# Patient Record
Sex: FEMALE | Race: WHITE | HISPANIC OR LATINO | Employment: FULL TIME | URBAN - METROPOLITAN AREA
[De-identification: names, ages, dates, MRNs, and addresses within clinical notes are randomized per-mention and may not be internally consistent; named-entity substitution may affect disease eponyms.]

---

## 2021-07-27 ENCOUNTER — PRENATAL OFFICE VISIT (OUTPATIENT)
Dept: NURSING | Facility: CLINIC | Age: 31
End: 2021-07-27

## 2021-07-27 VITALS — BODY MASS INDEX: 30.83 KG/M2 | WEIGHT: 174 LBS | HEIGHT: 63 IN

## 2021-07-27 DIAGNOSIS — Z98.891 HISTORY OF CESAREAN SECTION: ICD-10-CM

## 2021-07-27 DIAGNOSIS — Z23 NEED FOR TDAP VACCINATION: ICD-10-CM

## 2021-07-27 DIAGNOSIS — Z34.90 ENCOUNTER FOR SUPERVISION OF NORMAL PREGNANCY: Primary | ICD-10-CM

## 2021-07-27 PROCEDURE — 99207 PR NO CHARGE NURSE ONLY: CPT

## 2021-07-27 RX ORDER — PRENATAL VIT/IRON FUM/FOLIC AC 27MG-0.8MG
1 TABLET ORAL DAILY
COMMUNITY

## 2021-07-27 ASSESSMENT — MIFFLIN-ST. JEOR: SCORE: 1468.26
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
## 2021-08-09 ENCOUNTER — PRENATAL OFFICE VISIT (OUTPATIENT)
Dept: MIDWIFE SERVICES | Facility: CLINIC | Age: 31
End: 2021-08-09
Payer: MEDICAID

## 2021-08-09 VITALS
HEART RATE: 98 BPM | SYSTOLIC BLOOD PRESSURE: 114 MMHG | BODY MASS INDEX: 32.11 KG/M2 | HEIGHT: 63 IN | OXYGEN SATURATION: 98 % | DIASTOLIC BLOOD PRESSURE: 72 MMHG | WEIGHT: 181.2 LBS

## 2021-08-09 DIAGNOSIS — Z98.891 HISTORY OF CESAREAN SECTION: ICD-10-CM

## 2021-08-09 DIAGNOSIS — G44.209 TENSION HEADACHE: ICD-10-CM

## 2021-08-09 DIAGNOSIS — O34.219 PREVIOUS CESAREAN DELIVERY, ANTEPARTUM CONDITION OR COMPLICATION: Primary | ICD-10-CM

## 2021-08-09 LAB
ABO/RH(D): NORMAL
ALBUMIN UR-MCNC: NEGATIVE MG/DL
ANTIBODY SCREEN: NEGATIVE
APPEARANCE UR: CLEAR
BILIRUB UR QL STRIP: NEGATIVE
COLOR UR AUTO: YELLOW
ERYTHROCYTE [DISTWIDTH] IN BLOOD BY AUTOMATED COUNT: 13.6 % (ref 10–15)
GLUCOSE UR STRIP-MCNC: NEGATIVE MG/DL
HBA1C MFR BLD: 5.2 % (ref 0–5.6)
HCT VFR BLD AUTO: 34.2 % (ref 35–47)
HGB BLD-MCNC: 11.2 G/DL (ref 11.7–15.7)
HGB UR QL STRIP: ABNORMAL
KETONES UR STRIP-MCNC: NEGATIVE MG/DL
LEUKOCYTE ESTERASE UR QL STRIP: NEGATIVE
MCH RBC QN AUTO: 30.2 PG (ref 26.5–33)
MCHC RBC AUTO-ENTMCNC: 32.7 G/DL (ref 31.5–36.5)
MCV RBC AUTO: 92 FL (ref 78–100)
NITRATE UR QL: NEGATIVE
PH UR STRIP: 6.5 [PH] (ref 5–7)
PLATELET # BLD AUTO: 289 10E3/UL (ref 150–450)
RBC # BLD AUTO: 3.71 10E6/UL (ref 3.8–5.2)
SP GR UR STRIP: >=1.03 (ref 1–1.03)
SPECIMEN EXPIRATION DATE: NORMAL
UROBILINOGEN UR STRIP-ACNC: 0.2 E.U./DL
WBC # BLD AUTO: 10.9 10E3/UL (ref 4–11)

## 2021-08-09 PROCEDURE — 87086 URINE CULTURE/COLONY COUNT: CPT | Performed by: ADVANCED PRACTICE MIDWIFE

## 2021-08-09 PROCEDURE — 83036 HEMOGLOBIN GLYCOSYLATED A1C: CPT | Performed by: ADVANCED PRACTICE MIDWIFE

## 2021-08-09 PROCEDURE — G0145 SCR C/V CYTO,THINLAYER,RESCR: HCPCS | Performed by: ADVANCED PRACTICE MIDWIFE

## 2021-08-09 PROCEDURE — 86803 HEPATITIS C AB TEST: CPT | Performed by: ADVANCED PRACTICE MIDWIFE

## 2021-08-09 PROCEDURE — 86901 BLOOD TYPING SEROLOGIC RH(D): CPT | Performed by: ADVANCED PRACTICE MIDWIFE

## 2021-08-09 PROCEDURE — 81003 URINALYSIS AUTO W/O SCOPE: CPT | Performed by: ADVANCED PRACTICE MIDWIFE

## 2021-08-09 PROCEDURE — 87340 HEPATITIS B SURFACE AG IA: CPT | Performed by: ADVANCED PRACTICE MIDWIFE

## 2021-08-09 PROCEDURE — 87389 HIV-1 AG W/HIV-1&-2 AB AG IA: CPT | Performed by: ADVANCED PRACTICE MIDWIFE

## 2021-08-09 PROCEDURE — 86762 RUBELLA ANTIBODY: CPT | Performed by: ADVANCED PRACTICE MIDWIFE

## 2021-08-09 PROCEDURE — 86850 RBC ANTIBODY SCREEN: CPT | Performed by: ADVANCED PRACTICE MIDWIFE

## 2021-08-09 PROCEDURE — T1013 SIGN LANG/ORAL INTERPRETER: HCPCS | Mod: U4 | Performed by: ADVANCED PRACTICE MIDWIFE

## 2021-08-09 PROCEDURE — 86900 BLOOD TYPING SEROLOGIC ABO: CPT | Performed by: ADVANCED PRACTICE MIDWIFE

## 2021-08-09 PROCEDURE — 87624 HPV HI-RISK TYP POOLED RSLT: CPT | Performed by: ADVANCED PRACTICE MIDWIFE

## 2021-08-09 PROCEDURE — 36415 COLL VENOUS BLD VENIPUNCTURE: CPT | Performed by: ADVANCED PRACTICE MIDWIFE

## 2021-08-09 PROCEDURE — 86780 TREPONEMA PALLIDUM: CPT | Performed by: ADVANCED PRACTICE MIDWIFE

## 2021-08-09 PROCEDURE — 99203 OFFICE O/P NEW LOW 30 MIN: CPT | Performed by: ADVANCED PRACTICE MIDWIFE

## 2021-08-09 PROCEDURE — 85027 COMPLETE CBC AUTOMATED: CPT | Performed by: ADVANCED PRACTICE MIDWIFE

## 2021-08-09 RX ORDER — ACETAMINOPHEN 325 MG/1
325-650 TABLET ORAL EVERY 6 HOURS PRN
Qty: 90 TABLET | Refills: 1 | Status: SHIPPED | OUTPATIENT
Start: 2021-08-09 | End: 2021-10-26

## 2021-08-09 ASSESSMENT — MIFFLIN-ST. JEOR: SCORE: 1500.89

## 2021-08-09 NOTE — PROGRESS NOTES
"15w0d   Seen with phone .  Pt feeling well overall, c/o HA, reviewed comfort measures.  Pt had U/S done at [facility] 12w2d which correlates to her LMP date.  [facility], States it was after her due date, she was 3 cm, reports never having felt any pain or labor, the Doctor told her that the baby wasn't coming down and that she needed to have an emergency .  Pt would like to TOLAC if safe to do so.  Discussed need for MD visit for consult.  RTC 4-5 wks for anatomy survey and CNM visit.  FLORESITA Colon CNM     Sultana Khloe Alcon Brand is a 31 year old female    Pt presents to clinic today for a NOB visit.  Patient's last menstrual period was 2021.. Estimated Date of Delivery: 2022 is calculated from lmp and verified with U/S     She has had bleeding since her LMP which has now stopped..   She has had mild nausea. Weight loss has not occurred.   This was a planned pregnancy.   FOB is involved,       OTHER CONCERNS:     Pt's hx of HSV is negative    ============================================  PERSONAL/SOCIAL HISTORY  Lives lives with their family.  Employment: Homemaker.  Her job involves light activity .  Exercises no regular exercise program  Pt denies abuse and feels safe in her home and relationships.     REVIEW OF SYSTEMS  C: NEGATIVE for fever, chills, change in weight  I: NEGATIVE for worrisome rashes, moles or lesions  E/M: NEGATIVE for ear, mouth and throat problems  R: NEGATIVE for significant cough or SOB  CV: NEGATIVE for chest pain, palpitations or peripheral edema  GI: NEGATIVE for nausea, abdominal pain, heartburn, or change in bowel habits  : NEGATIVE for frequency, dysuria, or hematuria  PSYCHIATRIC:  NEGATIVE for depression, anxiety or other mental health concerns    PHYSICAL EXAM:  /72   Pulse 98   Ht 1.6 m (5' 2.99\")   Wt 82.2 kg (181 lb 3.2 oz)   LMP 2021   SpO2 98%   BMI 32.11 kg/m    BMI- Body " mass index is 32.11 kg/m ., RECOMMENDED WEIGHT GAIN: < 15 lbs.  GENERAL:  Pleasant pregnant female, alert, cooperative and well groomed.  SKIN:  Warm and dry, without lesions or rashes  HEAD: Symmetrical features.  MOUTH:  Buccal mucosa pink, moist without lesions.  Teeth in good repair.    NECK:  Thyroid without enlargement and nodules.  Lymph nodes not palpable.   LUNGS:  Clear to auscultation.      HEART:  RRR without murmur.  ABDOMEN: Soft without masses , tenderness or organomegaly.  No CVA tenderness.  Uterus palpable at size equal to dates.  Well healed scar from  section.  MUSCULOSKELETAL:  Full range of motion  EXTREMITIES:  No edema. No significant varicosities.     PELVIC EXAM:  GENITALIA: EGBUS normal. Vulva reveals no erythema or lesions.         VAGINA:  pink, normal rugae and discharge, no lesions, good tone.   CERVIX:  smooth, without discharge or CMT. Friable cervix with collection of pap smear               UTERUS: Anteverted,  nontender 15 week size.   ADNEXA:  Without masses or tenderness  RECTAL:  Normal appearance.  Digital exam deferred.    ASSESSMENT:  Intrauterine pregnancy at 15w0d weeks gestation.     (O34.219) Previous  delivery, antepartum condition or complication  (primary encounter diagnosis)  Comment:   Plan: US OB > 14 Weeks            (G44.209) Tension headache  Comment:   Plan: acetaminophen (TYLENOL) 325 MG tablet            (Z34.90) Encounter for supervision of normal pregnancy  Comment:   Plan: Pap imaged thin layer screen with HPV -         recommended age 30 - 65 years (select HPV order        below)              PLAN:  Oriented to CNM service.    Instructed on use of triage nurse line and contacting the on call CNM after hours for an urgent need such as fever, vagina bleeding, bladder or vaginal infection, rupture of membranes,  or term labor.      Discussed the indications, uses for and false positives for quad screen  and fetal survey ultrasound at  18-20 weeks gestation. Will inform us at the next visit if she wished to avail herself of these screens.    Instructed on best evidence for: weight gain for her weight and height for pregnancy; healthy diet; exercise and activity during pregnancy; and maintenance of a generally healthy lifestyle.     Discussed the harms, benefits, side effects and alternative therapies for current prescribed and OTC medications.    Lorene Quiroga CNM

## 2021-08-10 LAB
BACTERIA UR CULT: NO GROWTH
HBV SURFACE AG SERPL QL IA: NONREACTIVE
HCV AB SERPL QL IA: NONREACTIVE
HIV 1+2 AB+HIV1 P24 AG SERPL QL IA: NONREACTIVE
T PALLIDUM AB SER QL: NONREACTIVE

## 2021-08-11 LAB
BKR LAB AP GYN ADEQUACY: NORMAL
BKR LAB AP GYN INTERPRETATION: NORMAL
BKR LAB AP HPV REFLEX: NORMAL
BKR LAB AP LMP: NORMAL
BKR LAB AP PREVIOUS ABNORMAL: NORMAL
PATH REPORT.COMMENTS IMP SPEC: NORMAL
PATH REPORT.RELEVANT HX SPEC: NORMAL
RUBV IGG SERPL QL IA: 7.42 INDEX
RUBV IGG SERPL QL IA: POSITIVE

## 2021-08-13 LAB
HUMAN PAPILLOMA VIRUS 16 DNA: NEGATIVE
HUMAN PAPILLOMA VIRUS 18 DNA: NEGATIVE
HUMAN PAPILLOMA VIRUS FINAL DIAGNOSIS: NORMAL
HUMAN PAPILLOMA VIRUS OTHER HR: NEGATIVE

## 2021-09-13 ENCOUNTER — ANCILLARY PROCEDURE (OUTPATIENT)
Dept: ULTRASOUND IMAGING | Facility: CLINIC | Age: 31
End: 2021-09-13
Attending: ADVANCED PRACTICE MIDWIFE
Payer: MEDICAID

## 2021-09-13 DIAGNOSIS — O34.219 PREVIOUS CESAREAN DELIVERY, ANTEPARTUM CONDITION OR COMPLICATION: ICD-10-CM

## 2021-09-13 PROCEDURE — 76805 OB US >/= 14 WKS SNGL FETUS: CPT | Performed by: FAMILY MEDICINE

## 2021-09-14 ENCOUNTER — PRENATAL OFFICE VISIT (OUTPATIENT)
Dept: MIDWIFE SERVICES | Facility: CLINIC | Age: 31
End: 2021-09-14
Payer: MEDICAID

## 2021-09-14 VITALS — DIASTOLIC BLOOD PRESSURE: 64 MMHG | WEIGHT: 184 LBS | BODY MASS INDEX: 32.6 KG/M2 | SYSTOLIC BLOOD PRESSURE: 105 MMHG

## 2021-09-14 DIAGNOSIS — Z11.3 SCREENING FOR GONORRHEA: ICD-10-CM

## 2021-09-14 DIAGNOSIS — Z11.8 SPECIAL SCREENING EXAMINATION FOR CHLAMYDIAL DISEASE: ICD-10-CM

## 2021-09-14 DIAGNOSIS — B96.89 BV (BACTERIAL VAGINOSIS): ICD-10-CM

## 2021-09-14 DIAGNOSIS — K08.89 PAIN, DENTAL: ICD-10-CM

## 2021-09-14 DIAGNOSIS — Z34.82 ENCOUNTER FOR SUPERVISION OF OTHER NORMAL PREGNANCY, SECOND TRIMESTER: Primary | ICD-10-CM

## 2021-09-14 DIAGNOSIS — N76.0 BV (BACTERIAL VAGINOSIS): ICD-10-CM

## 2021-09-14 LAB
CLUE CELLS: PRESENT
TRICHOMONAS, WET PREP: ABNORMAL
WBC'S/HIGH POWER FIELD, WET PREP: ABNORMAL
YEAST, WET PREP: ABNORMAL

## 2021-09-14 PROCEDURE — 87591 N.GONORRHOEAE DNA AMP PROB: CPT | Performed by: ADVANCED PRACTICE MIDWIFE

## 2021-09-14 PROCEDURE — 87210 SMEAR WET MOUNT SALINE/INK: CPT | Performed by: ADVANCED PRACTICE MIDWIFE

## 2021-09-14 PROCEDURE — 87491 CHLMYD TRACH DNA AMP PROBE: CPT | Performed by: ADVANCED PRACTICE MIDWIFE

## 2021-09-14 PROCEDURE — 99213 OFFICE O/P EST LOW 20 MIN: CPT | Performed by: ADVANCED PRACTICE MIDWIFE

## 2021-09-14 RX ORDER — PRENATAL VIT/IRON FUM/FOLIC AC 27MG-0.8MG
1 TABLET ORAL DAILY
Qty: 90 TABLET | Refills: 3 | Status: ON HOLD | OUTPATIENT
Start: 2021-09-14 | End: 2021-11-06

## 2021-09-14 RX ORDER — METRONIDAZOLE 500 MG/1
500 TABLET ORAL 2 TIMES DAILY
Qty: 14 TABLET | Refills: 0 | Status: SHIPPED | OUTPATIENT
Start: 2021-09-14 | End: 2021-09-21

## 2021-09-14 NOTE — PROGRESS NOTES
20w1d  Sultana is here after fetal survey was done yesterday. Seen with assistance of  by phone. Fetal survey with normal fetal growth, but some structures not seen. Recommend follow-up in our clinic in 2-3w. She complains of pelvic pressure/heaviness. Denies vaginal symptoms. Wet prep, GC/CT collected. Wet prep shows BV, Flagyl sent to pharmacy, and patient updated with help of . Baby is active, denies bleeding, leaking of fluid, headaches. Return to care 2-3w for f/u U/S.  Wallace Scott CNM

## 2021-09-15 ENCOUNTER — PATIENT OUTREACH (OUTPATIENT)
Dept: CARE COORDINATION | Facility: CLINIC | Age: 31
End: 2021-09-15

## 2021-09-15 LAB
C TRACH DNA SPEC QL NAA+PROBE: NEGATIVE
N GONORRHOEA DNA SPEC QL NAA+PROBE: NEGATIVE

## 2021-09-15 NOTE — LETTER
[facility] CARE COORDINATION  [facility]  [address] [place] [address]      Estimado(a) Sultana,    Soy un(a) coordinador(a) de atención ambulatoria que trabaja con Wallace Scott CNM, en [facility]. La presente es para informarle acerca de la coordinación de atención ambulatoria y cómo podremos ayudarle a lograr los objetivos relacionados a ellis connor.    Nuestro equipo responsable de la coordinación de atención ambulatoria cuenta con un profesional de enfermería, un trabajador social y un promotor de connor. El objetivo de la coordinación de atención ambulatoria es ayudarle a atender ellis connor y a mejorar el acceso al sistema de atención médica de leif manera eficiente. Nuestro equipo puede ayudarle a lograr delfina objetivos para el cuidado de la connor al brindarle información médica, coordinar servicios, fortalecer la comunicación entre los proveedores y brindarle apoyo con recursos que necesite.    No dude en comunicarse con me al [phone] si tuviera cualquier pregunta o inquietud. Estamos enfocados en brindarle la mejor experiencia de atención médica posible. Queremos ayudarle a alcanzar y mantener delfina objetivos de connor.    Atentamente,     Brea Ballard  Community Health Worker  [facility]  [phone]      Dear ,    I am a clinic care coordinator who works with   . I'm writing to tell you about clinic care coordination, and how we may be able to support you in achieving your health-related goals.    Our clinic care coordination team consists of a registered nurse, , and community health worker. The goal of clinic care coordination is to help you manage your health and to improve your access to the health care system in an efficient way. Our team can help you meet your health care goals by providing you with health information, coordinating services, strengthening the  communication among your providers, and supporting you with any resource needs.    Please feel free to contact  at  with any questions or concerns. We are focused on providing you with the highest-quality health care experience possible. We want to help you achieve and maintain your health goals.    Sincerely,

## 2021-09-15 NOTE — PROGRESS NOTES
Clinic Care Coordination Contact  [facility]/Voicemail    Left VM on pt's phone this afternoon via     Chart review: PCP referral: Pt has tooth pain, needs help finding a dentist. DX: Pt is 20w2d pregnant. PMHX:  in [place] 10/15/18.     Clinical Data: Care Coordinator Outreach  Outreach attempted x 1.  Left message on patient's voicemail with call back information and requested return call.  Plan: Care Coordinator will try to reach patient again in 1-2 business days.    Brea Ballard  Community Health Worker  [facility] [facility]  [phone]

## 2021-09-15 NOTE — LETTER
[facility] CARE COORDINATION  [facility]  [address] [place] [address]    Sultana Rondon Zhigiu  [address]  [place] [address]      Estimado(a) Sultana,    Soy un(a) coordinador(a) de atención ambulatoria que trabaja con Wallace Scott CNM, en [facility]. La presente es para informarle acerca de la coordinación de atención ambulatoria y cómo podremos ayudarle a lograr los objetivos relacionados a ellis connor.    Nuestro equipo responsable de la coordinación de atención ambulatoria cuenta con un profesional de enfermería, un trabajador social y un promotor de connor. El objetivo de la coordinación de atención ambulatoria es ayudarle a atender ellis connor y a mejorar el acceso al sistema de atención médica de leif manera eficiente. Nuestro equipo puede ayudarle a lograr delfina objetivos para el cuidado de la connor al brindarle información médica, coordinar servicios, fortalecer la comunicación entre los proveedores y brindarle apoyo con recursos que necesite.    No dude en comunicarse con me al [phone] si tuviera cualquier pregunta o inquietud. Estamos enfocados en brindarle la mejor experiencia de atención médica posible. Queremos ayudarle a alcanzar y mantener delfina objetivos de connor.     line: [phone]    Atentamente,     Brea Ballard  Community Health Worker  [facility]  [phone]

## 2021-09-16 NOTE — PROGRESS NOTES
Clinic Care Coordination Contact  UTC/Voicemail    Left VM on pt's phone this afternoon via     Chart review: PCP referral: Pt has tooth pain, needs help finding a dentist. DX: Pt is 20w2d pregnant. PMHX:  in [place] 10/15/18.   Clinical Data: Care Coordinator Outreach  Outreach attempted x 2.  Left message on patient's voicemail with call back information and requested return call.  Plan: Care Coordinator will send care coordination introduction letter in Bengali with care coordinator contact information and explanation of care coordination services via mail. Care Coordinator will do no further outreaches at this time.    Brea Ballard  Community Health Worker  [facility] [place] [facility]  [phone]

## 2021-09-27 ENCOUNTER — APPOINTMENT (OUTPATIENT)
Dept: INTERPRETER SERVICES | Facility: CLINIC | Age: 31
End: 2021-09-27
Payer: MEDICAID

## 2021-10-26 ENCOUNTER — PRENATAL OFFICE VISIT (OUTPATIENT)
Dept: MIDWIFE SERVICES | Facility: CLINIC | Age: 31
End: 2021-10-26
Attending: ADVANCED PRACTICE MIDWIFE
Payer: COMMERCIAL

## 2021-10-26 ENCOUNTER — ANCILLARY PROCEDURE (OUTPATIENT)
Dept: ULTRASOUND IMAGING | Facility: CLINIC | Age: 31
End: 2021-10-26
Attending: ADVANCED PRACTICE MIDWIFE
Payer: COMMERCIAL

## 2021-10-26 VITALS
BODY MASS INDEX: 34.64 KG/M2 | HEART RATE: 86 BPM | DIASTOLIC BLOOD PRESSURE: 59 MMHG | SYSTOLIC BLOOD PRESSURE: 105 MMHG | WEIGHT: 195.5 LBS

## 2021-10-26 DIAGNOSIS — M54.50 LOW BACK PAIN DURING PREGNANCY IN SECOND TRIMESTER: ICD-10-CM

## 2021-10-26 DIAGNOSIS — Z34.82 ENCOUNTER FOR SUPERVISION OF OTHER NORMAL PREGNANCY, SECOND TRIMESTER: Primary | ICD-10-CM

## 2021-10-26 DIAGNOSIS — Z34.82 ENCOUNTER FOR SUPERVISION OF OTHER NORMAL PREGNANCY, SECOND TRIMESTER: ICD-10-CM

## 2021-10-26 DIAGNOSIS — Z23 NEED FOR PROPHYLACTIC VACCINATION AND INOCULATION AGAINST INFLUENZA: ICD-10-CM

## 2021-10-26 DIAGNOSIS — O26.892 LOW BACK PAIN DURING PREGNANCY IN SECOND TRIMESTER: ICD-10-CM

## 2021-10-26 LAB
GLUCOSE 1H P 50 G GLC PO SERPL-MCNC: 117 MG/DL (ref 70–129)
HGB BLD-MCNC: 11 G/DL (ref 11.7–15.7)
T PALLIDUM AB SER QL: NONREACTIVE

## 2021-10-26 PROCEDURE — 90471 IMMUNIZATION ADMIN: CPT | Performed by: ADVANCED PRACTICE MIDWIFE

## 2021-10-26 PROCEDURE — 99207 PR PRENATAL VISIT: CPT | Performed by: ADVANCED PRACTICE MIDWIFE

## 2021-10-26 PROCEDURE — 90686 IIV4 VACC NO PRSV 0.5 ML IM: CPT | Performed by: ADVANCED PRACTICE MIDWIFE

## 2021-10-26 PROCEDURE — 82951 GLUCOSE TOLERANCE TEST (GTT): CPT | Performed by: ADVANCED PRACTICE MIDWIFE

## 2021-10-26 PROCEDURE — 86780 TREPONEMA PALLIDUM: CPT | Performed by: ADVANCED PRACTICE MIDWIFE

## 2021-10-26 PROCEDURE — 76816 OB US FOLLOW-UP PER FETUS: CPT | Performed by: OBSTETRICS & GYNECOLOGY

## 2021-10-26 PROCEDURE — 36415 COLL VENOUS BLD VENIPUNCTURE: CPT | Performed by: ADVANCED PRACTICE MIDWIFE

## 2021-10-26 ASSESSMENT — PATIENT HEALTH QUESTIONNAIRE - PHQ9: SUM OF ALL RESPONSES TO PHQ QUESTIONS 1-9: 2

## 2021-10-26 NOTE — PROGRESS NOTES
26w1d  Sultana is seen with [place] phone  today. Reports good fetal movement. Denies leaking of fluid, vaginal bleeding, regular uterine contractions, headache, visual changes, or other concerns. She is here after repeat anatomy scan to visualize unseen anatomy. All anatomy WNL today. GCT today. Discussed TOLAC vs repeat CS and discussed risks and benefits of options. She was hoping for TOLAC but would like to think more about risks involved before she decides. Gave [place] handout with overview. Plan to schedule OBGYN visit to review surgical records and affirm she is good candidate for TOLAC if desired. Also of note, she is experiencing some low back pain and sciatic pain that radiates down her right leg. She started noticing in last 2 weeks and has not tried anything yet to help. Offered pregnancy support belt and she accepts. Discussed future referral to PT if needed. RTC in 2 weeks.    FLORESITA Irwin CNM

## 2021-11-06 ENCOUNTER — HOSPITAL ENCOUNTER (OUTPATIENT)
Facility: CLINIC | Age: 31
End: 2021-11-06
Admitting: ADVANCED PRACTICE MIDWIFE
Payer: COMMERCIAL

## 2021-11-06 ENCOUNTER — HOSPITAL ENCOUNTER (OUTPATIENT)
Facility: CLINIC | Age: 31
Discharge: HOME | End: 2021-11-06
Attending: ADVANCED PRACTICE MIDWIFE | Admitting: ADVANCED PRACTICE MIDWIFE
Payer: COMMERCIAL

## 2021-11-06 VITALS
HEART RATE: 95 BPM | SYSTOLIC BLOOD PRESSURE: 108 MMHG | DIASTOLIC BLOOD PRESSURE: 55 MMHG | TEMPERATURE: 98.2 F | RESPIRATION RATE: 18 BRPM

## 2021-11-06 DIAGNOSIS — B96.89 BV (BACTERIAL VAGINOSIS): ICD-10-CM

## 2021-11-06 DIAGNOSIS — N76.0 BV (BACTERIAL VAGINOSIS): ICD-10-CM

## 2021-11-06 DIAGNOSIS — R12 HEARTBURN: Primary | ICD-10-CM

## 2021-11-06 DIAGNOSIS — D50.9 IRON DEFICIENCY ANEMIA, UNSPECIFIED IRON DEFICIENCY ANEMIA TYPE: ICD-10-CM

## 2021-11-06 PROBLEM — Z36.89 ENCOUNTER FOR TRIAGE IN PREGNANT PATIENT: Status: ACTIVE | Noted: 2021-11-06

## 2021-11-06 LAB
ALBUMIN SERPL-MCNC: 2.6 G/DL (ref 3.4–5)
ALP SERPL-CCNC: 104 U/L (ref 40–150)
ALT SERPL W P-5'-P-CCNC: 25 U/L (ref 0–50)
ANION GAP SERPL CALCULATED.3IONS-SCNC: 4 MMOL/L (ref 3–14)
AST SERPL W P-5'-P-CCNC: 17 U/L (ref 0–45)
BILIRUB SERPL-MCNC: 0.3 MG/DL (ref 0.2–1.3)
BUN SERPL-MCNC: 8 MG/DL (ref 7–30)
CALCIUM SERPL-MCNC: 8.5 MG/DL (ref 8.5–10.1)
CHLORIDE BLD-SCNC: 107 MMOL/L (ref 94–109)
CLUE CELLS: PRESENT
CO2 SERPL-SCNC: 25 MMOL/L (ref 20–32)
CREAT SERPL-MCNC: 0.55 MG/DL (ref 0.52–1.04)
ERYTHROCYTE [DISTWIDTH] IN BLOOD BY AUTOMATED COUNT: 13.2 % (ref 10–15)
GFR SERPL CREATININE-BSD FRML MDRD: >90 ML/MIN/1.73M2
GLUCOSE BLD-MCNC: 100 MG/DL (ref 70–99)
HCT VFR BLD AUTO: 34.1 % (ref 35–47)
HGB BLD-MCNC: 11 G/DL (ref 11.7–15.7)
MCH RBC QN AUTO: 30.6 PG (ref 26.5–33)
MCHC RBC AUTO-ENTMCNC: 32.3 G/DL (ref 31.5–36.5)
MCV RBC AUTO: 95 FL (ref 78–100)
PLATELET # BLD AUTO: 244 10E3/UL (ref 150–450)
POTASSIUM BLD-SCNC: 3.5 MMOL/L (ref 3.4–5.3)
PROT SERPL-MCNC: 6.8 G/DL (ref 6.8–8.8)
RBC # BLD AUTO: 3.6 10E6/UL (ref 3.8–5.2)
SODIUM SERPL-SCNC: 136 MMOL/L (ref 133–144)
TRICHOMONAS, WET PREP: ABNORMAL
WBC # BLD AUTO: 9.9 10E3/UL (ref 4–11)
WBC'S/HIGH POWER FIELD, WET PREP: ABNORMAL
YEAST, WET PREP: ABNORMAL

## 2021-11-06 PROCEDURE — 250N000009 HC RX 250: Performed by: ADVANCED PRACTICE MIDWIFE

## 2021-11-06 PROCEDURE — 87210 SMEAR WET MOUNT SALINE/INK: CPT | Performed by: ADVANCED PRACTICE MIDWIFE

## 2021-11-06 PROCEDURE — 87491 CHLMYD TRACH DNA AMP PROBE: CPT | Performed by: ADVANCED PRACTICE MIDWIFE

## 2021-11-06 PROCEDURE — 85018 HEMOGLOBIN: CPT | Performed by: ADVANCED PRACTICE MIDWIFE

## 2021-11-06 PROCEDURE — 87591 N.GONORRHOEAE DNA AMP PROB: CPT | Performed by: ADVANCED PRACTICE MIDWIFE

## 2021-11-06 PROCEDURE — 59025 FETAL NON-STRESS TEST: CPT | Mod: 26 | Performed by: ADVANCED PRACTICE MIDWIFE

## 2021-11-06 PROCEDURE — 36415 COLL VENOUS BLD VENIPUNCTURE: CPT | Performed by: ADVANCED PRACTICE MIDWIFE

## 2021-11-06 PROCEDURE — 80053 COMPREHEN METABOLIC PANEL: CPT | Performed by: ADVANCED PRACTICE MIDWIFE

## 2021-11-06 PROCEDURE — 999N000104 HC STATISTIC NO CHARGE

## 2021-11-06 PROCEDURE — G0463 HOSPITAL OUTPT CLINIC VISIT: HCPCS

## 2021-11-06 PROCEDURE — 99213 OFFICE O/P EST LOW 20 MIN: CPT | Mod: 25 | Performed by: ADVANCED PRACTICE MIDWIFE

## 2021-11-06 PROCEDURE — 250N000013 HC RX MED GY IP 250 OP 250 PS 637: Performed by: ADVANCED PRACTICE MIDWIFE

## 2021-11-06 RX ORDER — CALCIUM CARBONATE 500 MG/1
500 TABLET, CHEWABLE ORAL DAILY PRN
Status: DISCONTINUED | OUTPATIENT
Start: 2021-11-06 | End: 2021-11-06

## 2021-11-06 RX ORDER — METRONIDAZOLE 500 MG/1
500 TABLET ORAL 2 TIMES DAILY
Qty: 14 TABLET | Refills: 0 | Status: SHIPPED | OUTPATIENT
Start: 2021-11-06 | End: 2021-11-13

## 2021-11-06 RX ORDER — ACETAMINOPHEN 325 MG/1
975 TABLET ORAL EVERY 6 HOURS PRN
Status: DISCONTINUED | OUTPATIENT
Start: 2021-11-06 | End: 2021-11-06

## 2021-11-06 RX ORDER — FAMOTIDINE 20 MG/1
20 TABLET, FILM COATED ORAL 2 TIMES DAILY
Qty: 60 TABLET | Refills: 1 | Status: ON HOLD | OUTPATIENT
Start: 2021-11-06 | End: 2022-01-25

## 2021-11-06 RX ORDER — ONDANSETRON 4 MG/1
4 TABLET, ORALLY DISINTEGRATING ORAL EVERY 6 HOURS PRN
Status: DISCONTINUED | OUTPATIENT
Start: 2021-11-06 | End: 2021-11-06 | Stop reason: HOSPADM

## 2021-11-06 RX ORDER — PROCHLORPERAZINE MALEATE 10 MG
10 TABLET ORAL EVERY 6 HOURS PRN
Status: DISCONTINUED | OUTPATIENT
Start: 2021-11-06 | End: 2021-11-06 | Stop reason: HOSPADM

## 2021-11-06 RX ORDER — ACETAMINOPHEN 325 MG/1
650 TABLET ORAL EVERY 4 HOURS PRN
Status: DISCONTINUED | OUTPATIENT
Start: 2021-11-06 | End: 2021-11-06 | Stop reason: HOSPADM

## 2021-11-06 RX ORDER — MULTIVIT WITH MINERALS/LUTEIN
250 TABLET ORAL EVERY OTHER DAY
Qty: 60 TABLET | Refills: 1 | Status: SHIPPED | OUTPATIENT
Start: 2021-11-06

## 2021-11-06 RX ORDER — FAMOTIDINE 20 MG/1
20 TABLET, FILM COATED ORAL ONCE
Status: COMPLETED | OUTPATIENT
Start: 2021-11-06 | End: 2021-11-06

## 2021-11-06 RX ORDER — METOCLOPRAMIDE HYDROCHLORIDE 5 MG/ML
10 INJECTION INTRAMUSCULAR; INTRAVENOUS EVERY 6 HOURS PRN
Status: DISCONTINUED | OUTPATIENT
Start: 2021-11-06 | End: 2021-11-06 | Stop reason: HOSPADM

## 2021-11-06 RX ORDER — PROCHLORPERAZINE 25 MG
25 SUPPOSITORY, RECTAL RECTAL EVERY 12 HOURS PRN
Status: DISCONTINUED | OUTPATIENT
Start: 2021-11-06 | End: 2021-11-06 | Stop reason: HOSPADM

## 2021-11-06 RX ORDER — FERROUS GLUCONATE 324(38)MG
324 TABLET ORAL EVERY OTHER DAY
Qty: 60 TABLET | Refills: 1 | Status: SHIPPED | OUTPATIENT
Start: 2021-11-06

## 2021-11-06 RX ORDER — LANOLIN ALCOHOL/MO/W.PET/CERES
1000 CREAM (GRAM) TOPICAL EVERY OTHER DAY
Qty: 60 TABLET | Refills: 1 | Status: SHIPPED | OUTPATIENT
Start: 2021-11-06

## 2021-11-06 RX ORDER — ONDANSETRON 2 MG/ML
4 INJECTION INTRAMUSCULAR; INTRAVENOUS EVERY 6 HOURS PRN
Status: DISCONTINUED | OUTPATIENT
Start: 2021-11-06 | End: 2021-11-06 | Stop reason: HOSPADM

## 2021-11-06 RX ORDER — METOCLOPRAMIDE 10 MG/1
10 TABLET ORAL EVERY 6 HOURS PRN
Status: DISCONTINUED | OUTPATIENT
Start: 2021-11-06 | End: 2021-11-06 | Stop reason: HOSPADM

## 2021-11-06 RX ORDER — CITRIC ACID/SODIUM CITRATE 334-500MG
30 SOLUTION, ORAL ORAL ONCE
Status: COMPLETED | OUTPATIENT
Start: 2021-11-06 | End: 2021-11-06

## 2021-11-06 RX ORDER — LIDOCAINE 40 MG/G
CREAM TOPICAL
Status: DISCONTINUED | OUTPATIENT
Start: 2021-11-06 | End: 2021-11-06

## 2021-11-06 RX ADMIN — FAMOTIDINE 20 MG: 20 TABLET ORAL at 16:07

## 2021-11-06 RX ADMIN — ACETAMINOPHEN 650 MG: 325 TABLET, FILM COATED ORAL at 14:20

## 2021-11-06 RX ADMIN — LIDOCAINE HYDROCHLORIDE 30 ML: 20 SOLUTION ORAL; TOPICAL at 16:25

## 2021-11-06 RX ADMIN — CALCIUM CARBONATE (ANTACID) CHEW TAB 500 MG 500 MG: 500 CHEW TAB at 12:50

## 2021-11-06 RX ADMIN — SODIUM CITRATE AND CITRIC ACID MONOHYDRATE 30 ML: 500; 334 SOLUTION ORAL at 12:50

## 2021-11-06 NOTE — DISCHARGE INSTRUCTIONS
Discharge Instruction for Undelivered Patients      You were seen for: pain  We Consulted: Fabby Fields CNM  You had (Test or Medicine): Fetal and uterine monitoring, blood work, speculum and vaginal exam     Diet:   Drink 8 to 12 glasses of liquids (milk, juice, water) every day.  You may eat meals and snacks.     Activity:  Count fetal kicks everyday (see handout)  Call your doctor or nurse midwife if your baby is moving less than usual.     Call your provider if you notice:  Swelling in your face or increased swelling in your hands or legs.  Headaches that are not relieved by Tylenol (acetaminophen).  Changes in your vision (blurring: seeing spots or stars.)  Nausea (sick to your stomach) and vomiting (throwing up).   Weight gain of 5 pounds or more per week.  Heartburn that doesn't go away.  Signs of bladder infection: pain when you urinate (use the toilet), need to go more often and more urgently.  The bag of warren (rupture of membranes) breaks, or you notice leaking in your underwear.  Bright red blood in your underwear.  Abdominal (lower belly) or stomach pain..  Second (plus) baby: Contractions (tightening) less than 10 minutes apart and getting stronger.  *If less than 34 weeks: Contractions (tightening) more than 6 times in one hour.  Increase or change in vaginal discharge (note the color and amount)      Follow-up:  As scheduled in the clinic, Please come to the [facility] Women's clinic on the 7th floor for your regular prenatal appointments

## 2021-11-06 NOTE — ED NOTES
Pt arrived to ED with complaint of abdominal pain .  Pt reports 26 weeks pregnant.   Pt is having contractions No.   Pt feels urge to push No.   Pt reports water broke No.   Report was called and pt was transferred to L&D Yes.

## 2021-11-06 NOTE — PLAN OF CARE
"Patient [facility] the [facility] today for complaints of middle upper abdominal pain. Patient states this pain has been happening since Thursday. The pain is constant pain and patient describes the pain as \"it feels uncomfortable like I am full like I ate too much.\" Patient says she also has been having intermittent pain in her right leg that radiates down into her foot, this makes it hard to walk sometimes. Patient denies any obstetrical complaints. FHR appropriate for gestational age (see flowsheet), no contractions noted. Speculum exam done per Fabby Fields CNM, patient is closed/long/high. Labs collected and sent. Bicitra and tums helped slightly with abdominal pain, patient also given tylenol and hot packs. Fabby Fields in another delivery, will reevaluate shortly.  "

## 2021-11-06 NOTE — PLAN OF CARE
Patient's pain was relieved to a tolerable level after administration of GI medications and hot packs. Patient has a history of sciatica and back pain this pregnancy- likely the cause of the lower back and leg pain. EDIS Fields at bedside with ipad interpretor and orderd pepcid BID and flagyl to treat patient's BV. Orders sent to patient's preferred pharmacy. Discharge instructions reviewed with patient and partner, Vignesh. Patient and  have no further questions at this time. Discharged ambulatory to home at 1740.

## 2021-11-06 NOTE — H&P
HOSPITAL TRIAGE NOTE  ===================    CHIEF COMPLAINT  ========================  Sultana Brand is a 31 year old patient presenting today at 27w5d for evaluation of abdominal pain.    Patient's last menstrual period was 2021.  Estimated Date of Delivery: 2022     HPI  ==================   Sultana reports getting abdominal pain starting 2 days ago. It was at its worse yesterday and has continued on for today. She has not tried any remedies at home. She reports the pain yesterday prevented her from eating. She was able to eat today and did not feel like it affected the pain. The pain is in her mid upper abdomen, right at her stomach. The pain is strong and radiates down to her back and down her right leg. Her back pain and right leg pain is worse with movement. No history of any abdominal pain. No history of heartburn. She has a history of sciatic pain. She has no concerns about the pregnancy. Baby is moving well. No contractions. No water leaking or vaginal bleeding. No nausea or vomiting. No diarrhea or constipation. No urinary symptoms. No other questions or concerns today.     CONTRACTIONS: none  ABDOMINAL PAIN: moderate  FETAL MOVEMENT: active    VAGINAL BLEEDING: none  RUPTURE OF MEMBRANES: no  PELVIC PAIN: none  OTHER: back pain and pain radiating down right leg.     REVIEW OF SYSTEMS  =====================  C: NEGATIVE for fever, chills  I: NEGATIVE for worrisome rashes, moles or lesions  E: NEGATIVE for vision changes or irritation  R: NEGATIVE for significant cough or SOB  CV: NEGATIVE for chest pain, palpitations or varicosities  GI: NEGATIVE for nausea, abdominal pain, heartburn, or change in bowel habits  : NEGATIVE for frequency, dysuria, or hematuria  M: NEGATIVE for significant arthralgias or myalgia  N: NEGATIVE for headache, weakness, dizziness or paresthesias  P: NEGATIVE for changes in mood or affect    HISTORIES  ==============  ALLERGIES:    No Known  Allergies  PAST MEDICAL HISTORY  No past medical history on file.  PARTNER: Vignesh     FAMILY HISTORY  Family History   Problem Relation Age of Onset     Hyperlipidemia Mother      Heart Surgery Sister      OB HISTORY  OB History    Para Term  AB Living   2 1 1 0 0 1   SAB TAB Ectopic Multiple Live Births   0 0 0 0 1      # Outcome Date GA Lbr Kemal/2nd Weight Sex Delivery Anes PTL Lv   2 Current            1 Term 10/15/18 40w0d  3.374 kg (7 lb 7 oz) F -SEC  N SONAL       EXAM  ============  /55   Pulse 95   Temp 98.2  F (36.8  C) (Oral)   Resp 18   LMP 2021   GENERAL APPEARANCE: healthy, alert and no distress  RESP: lungs clear to auscultation - no rales, rhonchi or wheezes  BREAST: normal without masses, tenderness or nipple discharge and no palpable axillary masses or adenopathy  CV: regular rates and rhythm, normal S1 S2, no S3 or S4 and no murmur,and no varicosities  ABDOMEN:  soft, nontender, epigastric pain, not worse with palpation   NEURO: Denies headache, blurred vision  PSYCH: mentation appears normal. and affect normal/bright  LEGS: Reflexes normal bilaterally    CONTRACTIONS: none  FETAL HEART TONES:  145 with moderate variability, accelerations-yes, decels none  NST: appropriate for 27 week fetus     PELVIC EXAM: closed/ long/ Posterior/ firm/ FLOATING  GOULD SCORE: 0  BLOOD: no  DISCHARGE: white    ROM: No  POOLING: negative  AMNISURE: not done    LABS:  WET PREP, GC/ CHLAMYDIA  CBC, CMP    DIAGNOSIS  ============  27w5d  Abdominal Pain, epigastric   Back pain, radiates to right leg   NST: appropriate for 27 week fetus   Fetal Heart rate tracing: category one    PLAN  ============  Ruled out any concerns with pregnancy. Baby is category 1. No contractions felt and no contractions recorded on monitor. SSE showed increased discharged. Wet prep collected and showed bacterial vaginosis. Discussed bacterial vaginosis and treatment. Discussed most likely unrelated to  epigastric pain but could help with back pain. Discussed her history of sciatic and pregnancy. She has a maternity support belt she wears and helps with the pain. Discussed PT is the next step for pain. Declines today and knows she can request it at anytime if needed. Discussed most likely the back pain that radiates down the right leg is due to bacterial vaginosis and sciatica.     Discussed with patient epigastric pain. CBC shows no signs of infection. CMP WNL. While here treated with tums and Bicitra with some relief of pain but not completely. Warm packs given. GI cocktail and pepcid given. After those two medications and warm packs patient had better relief of pain but not 100%. Discussed heartburn and pregnancy. Discussed pepcid use at home. Discussed potential for gall stones as the cause of her pain. With her pain more controlled discussed could wait to see how pain is over the next few days before doing the RUQ ultrasound to rule out gall stones. Discussed if she does have gall stones they sometimes pass on their own and the pain goes away. Worse case scenario is if pain is not managed well can consider removing gall bladder.  Discussed it is not an emergency so we do not need to know today if that is the cause. Feels like pain is tolerable now and would be okay to be discharged home. Discussed returning if pain gets worse. Discussed if continued pain we can do an outpatient RUQ ultrasound and continue evaluation. Has follow up scheduled this week in clinic. Will re-evaluate then. For now will continue heartburn treatment and tylenol and monitor pain.     (R12) Heartburn  (primary encounter diagnosis)  Plan: famotidine (PEPCID) 20 MG tablet    (N76.0,  B96.89) BV (bacterial vaginosis)  Plan: metroNIDAZOLE (FLAGYL) 500 MG tablet    (D50.9) Iron deficiency anemia, unspecified iron deficiency anemia type  Comment: Slightly anemia at 11. Discussed eating an iron rich diet for now. They prefer medications.  Discussed iron can be hard on the stomach so recommend diet only for now until stomach pains are better. Then she can start iron if she prefers that. Rx sent to  today since they are going to the pharmacy but will not start right away.   Plan: ferrous gluconate (FERGON) 324 (38 Fe) MG         tablet, cyanocobalamin (VITAMIN B-12) 1000 MCG         tablet, vitamin C (ASCORBIC ACID) 250 MG tablet     was present over the ipad during interview to ensure patient understanding of the instructions and counseling.    FLORESITA SanchezM

## 2021-11-07 LAB
C TRACH DNA SPEC QL NAA+PROBE: NEGATIVE
N GONORRHOEA DNA SPEC QL NAA+PROBE: NEGATIVE

## 2021-11-12 ENCOUNTER — APPOINTMENT (OUTPATIENT)
Dept: INTERPRETER SERVICES | Facility: CLINIC | Age: 31
End: 2021-11-12
Payer: COMMERCIAL

## 2021-11-16 ENCOUNTER — PRENATAL OFFICE VISIT (OUTPATIENT)
Dept: OBGYN | Facility: CLINIC | Age: 31
End: 2021-11-16
Payer: COMMERCIAL

## 2021-11-16 VITALS — BODY MASS INDEX: 35.35 KG/M2 | SYSTOLIC BLOOD PRESSURE: 106 MMHG | WEIGHT: 199.5 LBS | DIASTOLIC BLOOD PRESSURE: 66 MMHG

## 2021-11-16 DIAGNOSIS — M54.9 BACK PAIN IN PREGNANCY: ICD-10-CM

## 2021-11-16 DIAGNOSIS — O99.891 BACK PAIN IN PREGNANCY: ICD-10-CM

## 2021-11-16 DIAGNOSIS — Z34.80 SUPERVISION OF OTHER NORMAL PREGNANCY: Primary | ICD-10-CM

## 2021-11-16 DIAGNOSIS — Z23 NEED FOR TDAP VACCINATION: ICD-10-CM

## 2021-11-16 PROCEDURE — 90715 TDAP VACCINE 7 YRS/> IM: CPT | Performed by: OBSTETRICS & GYNECOLOGY

## 2021-11-16 PROCEDURE — 90471 IMMUNIZATION ADMIN: CPT | Performed by: OBSTETRICS & GYNECOLOGY

## 2021-11-16 PROCEDURE — 99207 PR PRENATAL VISIT: CPT | Performed by: OBSTETRICS & GYNECOLOGY

## 2021-11-16 NOTE — PROGRESS NOTES
Return OB visit    Subjective:  Patient reports active fetal movement, no vaginal bleeding or leaking fluid. She denies contractions. She is having some right leg/hip pain and sciatica worse over the past few weeks. She presents today to discuss TOLAC. She reports a history of a CS in [facility] that was done prior to onset of labor. She reports that it was recommended because she was past her due date and they were concerned that baby wouldn't fit. She was unsure what type of incision was mad luz her uterus but was never told that she couldn't TOLAC in the future. She is unsure of the name of the hospital and no records have been saved to her chart from her prior OP note.        Objective:  /66 (BP Location: Right arm, Patient Position: Sitting, Cuff Size: Adult Regular)   Wt 90.5 kg (199 lb 8 oz)   LMP 2021   BMI 35.35 kg/m     See OB flow sheet    Assessment and Plan    Sultana Brand is a 31 year old  at 29w1d here for JACKELIN visit    This visit:  -We discussed risks and benefits of trial of labor after  section. She had prior CS prior to onset of labor, presumable LTCS but records not obtained yet. She will try to find the name of the hospital so we can send a LOVE during her next prenatal visit.   Risks of  section includes longer length of hospital stay, increased risk of infection and bleeding. Risk of damage to the bowels/bladder and increased risk of placenta accreta and need for  hysterectomy with each subsequent .  Risk of TOLAC includes an approximately 1:200 risk of uterine rupture which could result in permanent fetal neurological damage, fetal death, maternal blood loss requiring blood transfusion and/or hysterectomy. This risk is higher with more than one prior  in the setting of labor induction/augmentation with oxytocin. We also discussed that the best candidates for TOLAC are women with a high liklihood of success for having a  vaginal delivery as the risk of complications increased if a  section is done intrapartum vs prior to the onset of labor. Likelihood of success based on MFMU calculator 57%.   -Patient also considering tubal ligation so we discussed that this could be performed during a scheduled  or following a vaginal birth, we also briefly discussed alternatives including LARC and vasectomy.  -All questions answered but patient still uncertain, would like to follow up in 2 weeks to further disucss   -Tdap given today  -PT referral placed for sciatic pain     Next visit:  -Delivery planning, further discuss tubal ligation and sign paperwork     RTC in 2 weeks

## 2021-11-30 ENCOUNTER — THERAPY VISIT (OUTPATIENT)
Dept: PHYSICAL THERAPY | Facility: CLINIC | Age: 31
End: 2021-11-30
Attending: OBSTETRICS & GYNECOLOGY
Payer: COMMERCIAL

## 2021-11-30 DIAGNOSIS — M54.9 BACK PAIN IN PREGNANCY: ICD-10-CM

## 2021-11-30 DIAGNOSIS — O99.891 BACK PAIN IN PREGNANCY: ICD-10-CM

## 2021-11-30 PROCEDURE — 97110 THERAPEUTIC EXERCISES: CPT | Mod: GP | Performed by: PHYSICAL THERAPIST

## 2021-11-30 PROCEDURE — 97161 PT EVAL LOW COMPLEX 20 MIN: CPT | Mod: GP | Performed by: PHYSICAL THERAPIST

## 2021-11-30 NOTE — PROGRESS NOTES
Physical Therapy Initial Evaluation  Subjective:  The history is provided by the patient. A  was used.   Patient Health History  Sultana Brand being seen for pain in both legs, began 3 months ago.     Date of Onset: 21 date of order.      Pain is reported as 5/10 on pain scale.  General health as reported by patient is good.  Health conditions: currently pregnant, nubness/tingling, calf pain/swelling, headaches.   Red flags:  None as reported by patient.  Medical allergies: none.    Other surgery history details: .     Other medications details: vitamins for pregnancy.    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   Primary job tasks include:  Prolonged standing.                  Therapist Generated HPI Evaluation  Problem details: The pt is most frustrated with pain down her right leg. Sometimes she will stand up and feel like she cannot feel her leg. Sometimes she will get pain in both of her legs. She started having this pain about 3 months ago, which was worsened. The numbness down her right leg will come on suddenly and last for a few seconds. She had once incidence of her right leg giving out on her with going down the stairs, she held on to the railing and did not fall. The pt was given a pregnancy belt to help with her pain. The pt is 32 weeks pregnant today. No kids at home. .         Type of problem:  Lumbar.    This is a chronic condition.  Condition occurred with:  Other reason.  Where condition occurred: for unknown reasons.  Patient reports pain:  SI joint left, SI joint right, lumbar spine right and lumbar spine left.  Pain is described as aching and shooting and is intermittent.  Pain radiates to:  Knee right, thigh left, thigh right, lower leg left, knee left, lower leg right, gluteals right and gluteals left. Pain is worse during the day.  Since onset symptoms are gradually worsening.  Associated symptoms:  Pregnancy, loss of balance and loss of strength.  Symptoms are exacerbated by walking and standing  and relieved by heat and rest.      Barriers include:  None as reported by patient.                        Objective:  Standing Alignment:        Lumbar:  Lordosis incr                Flexibility/Screens:       Lower Extremity:  Decreased left lower extremity flexibility:Hip Flexors    Decreased right lower extremity flexibility:  Hip Flexors               Lumbar/SI Evaluation  ROM:    AROM Lumbar:   Flexion:        Major loss, reduction of leg pain after repeated flexion in standing  Ext:                    Mild loss   Side Bend:        Left:     Right:   Rotation:           Left:     Right:   Side Glide:        Left:     Right:         Strength: trendelenburg positive bilaterally  Lumbar Myotomes:    T12-L3 (Hip Flex):  Left: 5    Right: 5-  L2-4 (Quads):  Left:  5    Right:  5-  L4 (Ankle DF):  Left:  5    Right:  5          Lumbar Dermtomes:  normal                Neural Tension/Mobility:      Left side:Slump  negative.     Right side:   Slump  negative.   Lumbar Palpation:    Tenderness present at Left:    Quadratus Lumborum; Erector Spinae and Gluteus Medius  Tenderness present at Right: Quadratus Lumborum; Erector Spinae; Piriformis and Gluteus Medius        SI joint/Sacrum:    Increase in pain with SL stance with compression at ilium                                                       General     ROS    Assessment/Plan:    Patient is a 31 year old female with lumbar complaints.    Patient has the following significant findings with corresponding treatment plan.                Diagnosis 1:  Lumbar radiculopathy  Pain -  hot/cold therapy, US, electric stimulation, mechanical traction, manual therapy, STS, splint/taping/bracing/orthotics, self management, education, directional preference exercise and home program  Decreased ROM/flexibility - manual therapy, therapeutic exercise, therapeutic activity and home program  Decreased strength - therapeutic exercise,  therapeutic activities and home program  Impaired muscle performance - biofeedback, electric stimulation, neuro re-education and home program    Therapy Evaluation Codes:   Cumulative Therapy Evaluation is: Low complexity.    Previous and current functional limitations:  (See Goal Flow Sheet for this information)    Short term and Long term goals: (See Goal Flow Sheet for this information)     Communication ability:  Patient appears to be able to clearly communicate and understand verbal and written communication and follow directions correctly.  Treatment Explanation - The following has been discussed with the patient:   RX ordered/plan of care  Anticipated outcomes  Possible risks and side effects  This patient would benefit from PT intervention to resume normal activities.   Rehab potential is good.    Frequency:  1 X week, once daily  Duration:  for 8 weeks  Discharge Plan:  Achieve all LTG.  Independent in home treatment program.  Reach maximal therapeutic benefit.    Please refer to the daily flowsheet for treatment today, total treatment time and time spent performing 1:1 timed codes.

## 2021-11-30 NOTE — PROGRESS NOTES
[facility]    OUTPATIENT Physical Therapy ORTHOPEDIC EVALUATION  PLAN OF TREATMENT FOR OUTPATIENT REHABILITATION  (COMPLETE FOR INITIAL CLAIMS ONLY)  Patient's Last Name, First Name, M.I.  YOB: 1990  Sultana Lima    Provider s Name:  [facility]   Medical Record No.  [id number]   Start of Care Date:  11/30/21   Onset Date:   11/16/21   Type:     _X__PT   ___OT Medical Diagnosis:    Encounter Diagnosis   Name Primary?     Back pain in pregnancy         Treatment Diagnosis:  lumbar radiculopathy R>L        Goals:     11/30/21 0500   Body Part   Goals listed below are for R Lumbar radic, occasionally B   Goal #1   Goal #1 ambulation   Current Functional Level Minutes patient can walk   Performance Level 10 minutes 7/10p   STG Target Performance Minutes patient will be able to walk   Performance Level 10 minutes 4/10p   Rationale for safe household ambulation;for safe outdoor household ambulation;for safe community ambulation;to maintain proper body mechanics/posture while ambulating to avoid additional compensatory injury due to improper gait mechanics;to promote a healthy and active lifestyle   Due Date 01/04/22    LTG Target Performance Minutes patient will be able to  walk   Performance Level 20 minutes 4/10p   Rationale for safe household ambulation;for safe outdoor household ambulation;for safe community ambulation;to maintain proper body mechanics/posture while ambulating to avoid additional compensatory injury due to improper gait mechanics;to promote a healthy and active lifestyle   Due Date 01/25/22       Therapy Frequency:  1x a week  Predicted Duration of Therapy Intervention:  8 weeks    Lindsay Rivers, PT                 I CERTIFY THE NEED FOR THESE SERVICES FURNISHED UNDER        THIS PLAN OF TREATMENT AND WHILE UNDER MY CARE      (Physician attestation of this document indicates review and certification of the therapy plan).                       Certification Date From:  11/30/21   Certification Date To:  01/25/22    Referring Provider:  Charisse Pereira    Initial Assessment        See Epic Evaluation SOC Date: 11/30/21

## 2021-12-02 ENCOUNTER — PRENATAL OFFICE VISIT (OUTPATIENT)
Dept: OBGYN | Facility: CLINIC | Age: 31
End: 2021-12-02
Payer: COMMERCIAL

## 2021-12-02 VITALS
WEIGHT: 201 LBS | HEART RATE: 93 BPM | TEMPERATURE: 96.6 F | BODY MASS INDEX: 35.62 KG/M2 | DIASTOLIC BLOOD PRESSURE: 62 MMHG | SYSTOLIC BLOOD PRESSURE: 107 MMHG

## 2021-12-02 DIAGNOSIS — O34.219 PREVIOUS CESAREAN DELIVERY AFFECTING PREGNANCY: Primary | ICD-10-CM

## 2021-12-02 PROCEDURE — 99207 PR PRENATAL VISIT: CPT | Performed by: OBSTETRICS & GYNECOLOGY

## 2021-12-02 PROCEDURE — T1013 SIGN LANG/ORAL INTERPRETER: HCPCS | Mod: U4 | Performed by: OBSTETRICS & GYNECOLOGY

## 2021-12-02 NOTE — PROGRESS NOTES
Return OB visit    Subjective:  Patient reports active fetal movement, no vaginal bleeding or leaking fluid. She denies contractions. She has no concerns today, read through information on TOLAC but still uncertain. Had discussed tubal ligation at prior visit and she declines this today.       Objective:  /62   Pulse 93   Temp (!) 96.6  F (35.9  C) (Oral)   Wt 91.2 kg (201 lb)   LMP 2021   BMI 35.62 kg/m     See OB flow sheet    Assessment and Plan    Sultana Brand is a 31 year old  at 31w3d here for JACKELIN visit, pregnancy complicated by prior CS    This visit:  -Reviewed risks/benefits of TOLAC vs CS again. Patient having difficulty deciding but ultimately would like to be scheduled for a repeat CS at 40 weeks and open to TOLAC if she were to start laboring prior to that date.   -Case request placed     RTC in 1-2 weeks for routine PNC with SYLVIE Pereira MD

## 2021-12-07 ENCOUNTER — TELEPHONE (OUTPATIENT)
Dept: OBGYN | Facility: CLINIC | Age: 31
End: 2021-12-07
Payer: COMMERCIAL

## 2021-12-07 NOTE — TELEPHONE ENCOUNTER
Type of surgery: ob  Location of surgery: [facility] [place] [facility]  Date and time of surgery: 01/31/22 10:30AM  Surgeon: Randall  Pre-Op Appt Date: surgeon  Post-Op Appt Date: 03/14/22   Packet sent out: Yes  Pre-cert/Authorization completed:  Not Applicable  Date: 12/07/21     [facility] OB/GYN Surgery Scheduler

## 2021-12-16 ENCOUNTER — PRENATAL OFFICE VISIT (OUTPATIENT)
Dept: OBGYN | Facility: CLINIC | Age: 31
End: 2021-12-16
Payer: COMMERCIAL

## 2021-12-16 VITALS
WEIGHT: 202.9 LBS | BODY MASS INDEX: 35.95 KG/M2 | HEIGHT: 63 IN | HEART RATE: 87 BPM | SYSTOLIC BLOOD PRESSURE: 101 MMHG | DIASTOLIC BLOOD PRESSURE: 67 MMHG

## 2021-12-16 DIAGNOSIS — Z34.80 SUPERVISION OF OTHER NORMAL PREGNANCY: Primary | ICD-10-CM

## 2021-12-16 PROCEDURE — 99207 PR PRENATAL VISIT: CPT | Performed by: OBSTETRICS & GYNECOLOGY

## 2021-12-16 ASSESSMENT — MIFFLIN-ST. JEOR: SCORE: 1599.35

## 2021-12-16 NOTE — PROGRESS NOTES
"Return OB visit    Subjective:  Patient reports active fetal movement, no vaginal bleeding or leaking fluid. She denies contractions. She is feeling more pelvic pressure than she had with her prior pregnancy but no signs of labor. She also reports seeing \"floaters\" in her vision occasionally, typically when she turns her head or changes position. This has been going on for several weeks and occurs 2-3x/week and lasts about 10-15 seconds. She has had intermittent headaches throughout the pregnancy but nothing that seems out of the norm for her or is getting worse. No RUQ/abdominal pain.     She was also previously seen by the CNM group and is scheduled with me for a repeat CS on  if she does not labor prior. She had scheduled her follow up OB visits with me as well instead of the CNM group, we discussed the option of continuing prenatal care with the nurse midwives and having them attend her labor and delivery if she were to TOLAC but she would like to transfer to the MD group. She is aware of our shared call schedule.        Objective:  /67 (BP Location: Left arm, Patient Position: Sitting, Cuff Size: Adult Regular)   Pulse 87   Ht 1.6 m (5' 2.99\")   Wt 92 kg (202 lb 14.4 oz)   LMP 2021   BMI 35.95 kg/m     See OB flow sheet    Assessment and Plan    Sultana Brand is a 31 year old  at 33w3d here for JACKELIN visit    This visit:  -Reviewed warning signs for pre-e with patient and that she should also seek emergency care with any persistent or more frequent visual changes or loss of vision but the floaters that she describes seem more consistent with positional changes and she is comfortable with the plan to continue monitoring     Next visit:  -GBS     RTC in 2 weeks or sooner PRN     "

## 2021-12-28 ENCOUNTER — PRENATAL OFFICE VISIT (OUTPATIENT)
Dept: OBGYN | Facility: CLINIC | Age: 31
End: 2021-12-28
Payer: COMMERCIAL

## 2021-12-28 VITALS
HEART RATE: 100 BPM | OXYGEN SATURATION: 98 % | WEIGHT: 207.2 LBS | BODY MASS INDEX: 36.71 KG/M2 | DIASTOLIC BLOOD PRESSURE: 72 MMHG | SYSTOLIC BLOOD PRESSURE: 117 MMHG

## 2021-12-28 DIAGNOSIS — R09.81 CONGESTION OF PARANASAL SINUS: Primary | ICD-10-CM

## 2021-12-28 LAB — SARS-COV-2 RNA RESP QL NAA+PROBE: POSITIVE

## 2021-12-28 PROCEDURE — 99207 PR PRENATAL VISIT: CPT | Performed by: OBSTETRICS & GYNECOLOGY

## 2021-12-28 PROCEDURE — U0005 INFEC AGEN DETEC AMPLI PROBE: HCPCS | Performed by: OBSTETRICS & GYNECOLOGY

## 2021-12-28 PROCEDURE — U0003 INFECTIOUS AGENT DETECTION BY NUCLEIC ACID (DNA OR RNA); SEVERE ACUTE RESPIRATORY SYNDROME CORONAVIRUS 2 (SARS-COV-2) (CORONAVIRUS DISEASE [COVID-19]), AMPLIFIED PROBE TECHNIQUE, MAKING USE OF HIGH THROUGHPUT TECHNOLOGIES AS DESCRIBED BY CMS-2020-01-R: HCPCS | Performed by: OBSTETRICS & GYNECOLOGY

## 2021-12-28 RX ORDER — GUAIFENESIN 600 MG/1
1200 TABLET, EXTENDED RELEASE ORAL 2 TIMES DAILY
Qty: 28 TABLET | Refills: 0 | Status: SHIPPED | OUTPATIENT
Start: 2021-12-28 | End: 2022-01-04

## 2021-12-28 NOTE — PROGRESS NOTES
Return OB visit    Subjective:  Patient reports active fetal movement, no vaginal bleeding or leaking fluid. She denies contractions. She reports feeling like she has the flu with nasal congestion. No cough, sore throat or fevers. No known sick contacts. Has had COVID and Flu vaccine.        Objective:  /72   Pulse 100   Wt 94 kg (207 lb 3.2 oz)   LMP 2021   SpO2 98%   Breastfeeding No   BMI 36.71 kg/m     See OB flow sheet    Assessment and Plan    Sultana Brand is a 31 year old  at 35w1d here for JACKELIN visit, pregnancy complicated by prior LTCS    This visit:  -COVID and Flu swabs obtained in clinic, recommend symptomatic management at this time (tylenol, decongestant as needed)     Next visit:  -GBS     RTC in 1 weeks    Charisse Pereira MD

## 2021-12-30 ENCOUNTER — VIRTUAL VISIT (OUTPATIENT)
Dept: OBGYN | Facility: CLINIC | Age: 31
End: 2021-12-30
Payer: COMMERCIAL

## 2021-12-30 DIAGNOSIS — U07.1 INFECTION DUE TO 2019 NOVEL CORONAVIRUS: Primary | ICD-10-CM

## 2021-12-30 PROCEDURE — 99207 PR PRENATAL VISIT: CPT | Performed by: OBSTETRICS & GYNECOLOGY

## 2021-12-30 NOTE — PROGRESS NOTES
Sultana is a 31 year old who is being evaluated via a billable telephone visit.      What phone number would you like to be contacted at? [phone]  How would you like to obtain your AVS? Mail a copy    Subjective   Sultana is a 31 year old  at 35w3d who presents for a virtual visit due to recent COVID diagnosis on 21. She reports that her symptoms are mild (headahce, body aches, congestion). She denies any shortness of breath or cough. Denies chest pain. Denies fever. Baby is active, no other OB complaints.   The RNs helped her to fill out the referral to the [facility] department of health to receive Monoclonal antibodies but she was unable to be scheduled due to low supply. She reports that no one else in the household is sick and she has been trying to limit contact with others in her household.     Objective         Vitals:  No vitals were obtained today due to virtual visit.    Assessment and Plan:   Sultana Brand is a 32 YO  who presents at 35w3d after a positive COVID-19 test   -We discussed the CDC recommendations for isolation following a positive COVID test as well as symptoms for monitor for at home and when to seek emergency care for worsening disease   -We reviewed that unless she was having any acute concerns, we would try to avoid having her come for in person appointments during her isolation period. Her next visit on  was rescheduled as a virtual visit and she was scheduled her next in person visit the following week. We discussed that we would still want to see her for in person care for worsening COVID or any acute OB complaints.   -We reviewed safe OTC medications to use during pregnancy for symptomatic treatment     Phone call duration: 15 minutes

## 2022-01-04 ENCOUNTER — PRENATAL OFFICE VISIT (OUTPATIENT)
Dept: OBGYN | Facility: CLINIC | Age: 32
End: 2022-01-04
Payer: COMMERCIAL

## 2022-01-04 DIAGNOSIS — Z34.80 SUPERVISION OF OTHER NORMAL PREGNANCY: Primary | ICD-10-CM

## 2022-01-04 PROCEDURE — T1013 SIGN LANG/ORAL INTERPRETER: HCPCS | Mod: U4

## 2022-01-04 PROCEDURE — 99207 PR PRENATAL VISIT: CPT | Performed by: OBSTETRICS & GYNECOLOGY

## 2022-01-04 NOTE — PROGRESS NOTES
"Telephone prenatal visit     HPI: Sultana Brand is a 32 YO  at 36w1d who presents for a virtual prenatal visit due to recent COVID infection (positive on 2021). She reports that she is doing better each day, no fever recently but mostly just a productive cough. Denies chest pain or SOB. She does report that baby is less active this morning, has moved a few times but less than normal. Has not done kick counts. No contractions, vaginal bleeding or LOF. She had a 10 minute episode of RUQ abdominal pain two days ago but this resolved and has not returned, no headache currently. No visual changes except for occasional floaters which is stable for her. No one else at home has gotten sick from COVID. She does not have a blood pressure cuff at home.     O: Vital signs:                         Estimated body mass index is 36.71 kg/m  as calculated from the following:    Height as of 21: 1.6 m (5' 2.99\").    Weight as of 21: 94 kg (207 lb 3.2 oz).    Assessment and plan:   Sultana Brand is a 32 YO  at 36w1d who presents for virtual prenatal visit, pregnancy complicated by COVID-19 infection and prior LTCS   -Reviewed [facility] with patient as well as the phone number for  services and clinic. She was instructed to monitor movement over the next two hours and call if baby is not moving at least 10 times in 2 hours.   -Patient previously normotensive but unable to check BP today. No symptoms of pre-e currently, she does have occasional headaches throughout the pregnancy and had an isolated episode of RUQ abdominal pain two days ago which has not returned. She was instructed to call if she has any of these symptoms.   -Plan for GBS at next visit, will also order growth US once she is out of isolation precautions.     Total time spent on telephone visit: 15 min     Dispo: RTC in one week or sooner EDER Pereira MD           "

## 2022-01-12 ENCOUNTER — PRENATAL OFFICE VISIT (OUTPATIENT)
Dept: OBGYN | Facility: CLINIC | Age: 32
End: 2022-01-12
Payer: COMMERCIAL

## 2022-01-12 VITALS — BODY MASS INDEX: 37.1 KG/M2 | HEART RATE: 91 BPM | OXYGEN SATURATION: 99 % | WEIGHT: 209.4 LBS

## 2022-01-12 DIAGNOSIS — O98.513 COVID-19 AFFECTING PREGNANCY IN THIRD TRIMESTER: ICD-10-CM

## 2022-01-12 DIAGNOSIS — Z34.80 SUPERVISION OF OTHER NORMAL PREGNANCY: ICD-10-CM

## 2022-01-12 DIAGNOSIS — U07.1 COVID-19 AFFECTING PREGNANCY IN THIRD TRIMESTER: ICD-10-CM

## 2022-01-12 LAB — HGB BLD-MCNC: 11.1 G/DL (ref 11.7–15.7)

## 2022-01-12 PROCEDURE — 36415 COLL VENOUS BLD VENIPUNCTURE: CPT | Performed by: OBSTETRICS & GYNECOLOGY

## 2022-01-12 PROCEDURE — 99207 PR PRENATAL VISIT: CPT | Performed by: OBSTETRICS & GYNECOLOGY

## 2022-01-12 PROCEDURE — 87653 STREP B DNA AMP PROBE: CPT | Performed by: OBSTETRICS & GYNECOLOGY

## 2022-01-12 NOTE — PROGRESS NOTES
37w2d  Some contractions. Active fetal movement. No leaking or bleeding  BREECH by BSUS.  Discussed that if she goes into labor or water breaks she should not eat anything and go directly to the hospital. Discussed risk of cord prolapse.  Discussed ECV vs CS. Desires CS. Already has CS sched 1/31 but recommend we resched to 39w. I will have our  call her tomorrow.   On and off headaches throughout the pregnancy, unchanged.  GBS collected.  Due to language barrier, a phone  was used during the history-taking, exam and subsequent discussion with this patient.  RTC weekly  Aracelis Matson MD

## 2022-01-12 NOTE — Clinical Note
Can you reschedule this patient's CS for 1/24 or 1/25? Her baby is breech so she should be delivered at 39w. Aracelis Matson MD

## 2022-01-13 LAB
GP B STREP DNA SPEC QL NAA+PROBE: NEGATIVE
PATIENT PENICILLIN, AMOXICILLIN, CEPHALOSPORINS ALLERGY: NO

## 2022-01-18 ENCOUNTER — PRENATAL OFFICE VISIT (OUTPATIENT)
Dept: OBGYN | Facility: CLINIC | Age: 32
End: 2022-01-18
Payer: COMMERCIAL

## 2022-01-18 VITALS
WEIGHT: 208.9 LBS | HEART RATE: 110 BPM | BODY MASS INDEX: 37.02 KG/M2 | DIASTOLIC BLOOD PRESSURE: 69 MMHG | SYSTOLIC BLOOD PRESSURE: 115 MMHG | OXYGEN SATURATION: 100 % | HEIGHT: 63 IN

## 2022-01-18 DIAGNOSIS — Z34.80 SUPERVISION OF OTHER NORMAL PREGNANCY: Primary | ICD-10-CM

## 2022-01-18 DIAGNOSIS — Z01.818 PRE-OP EXAM: Primary | ICD-10-CM

## 2022-01-18 PROCEDURE — T1013 SIGN LANG/ORAL INTERPRETER: HCPCS | Mod: GT | Performed by: OBSTETRICS & GYNECOLOGY

## 2022-01-18 PROCEDURE — 59426 ANTEPARTUM CARE ONLY: CPT | Performed by: OBSTETRICS & GYNECOLOGY

## 2022-01-18 PROCEDURE — 99207 PR PRENATAL VISIT: CPT | Performed by: OBSTETRICS & GYNECOLOGY

## 2022-01-18 RX ORDER — IBUPROFEN 600 MG/1
600 TABLET, FILM COATED ORAL EVERY 6 HOURS PRN
Qty: 60 TABLET | Refills: 0 | Status: SHIPPED | OUTPATIENT
Start: 2022-01-18

## 2022-01-18 RX ORDER — AMOXICILLIN 250 MG
1 CAPSULE ORAL DAILY
Qty: 100 TABLET | Refills: 0 | Status: SHIPPED | OUTPATIENT
Start: 2022-01-18

## 2022-01-18 RX ORDER — ACETAMINOPHEN 325 MG/1
650 TABLET ORAL EVERY 6 HOURS PRN
Qty: 100 TABLET | Refills: 0 | Status: SHIPPED | OUTPATIENT
Start: 2022-01-18

## 2022-01-18 ASSESSMENT — MIFFLIN-ST. JEOR: SCORE: 1626.56

## 2022-01-18 NOTE — PROGRESS NOTES
"Return OB visit    Subjective:  Patient reports active fetal movement, no vaginal bleeding or leaking fluid. She denies contractions. She has no concerns today.       Objective:  /69   Pulse 110   Ht 1.6 m (5' 2.99\")   Wt 94.8 kg (208 lb 14.4 oz)   LMP 2021   SpO2 100%   BMI 37.01 kg/m     See OB flow sheet    Assessment and Plan    Sultana Brand is a 31 year old  at 38w1d here for JACKELIN visit    This visit:  -Reviewed pre-op instructions prior to , scheduled for    -Post-op meds sent to pharmacy     Charisse Pereira MD      "

## 2022-01-19 ENCOUNTER — APPOINTMENT (OUTPATIENT)
Dept: INTERPRETER SERVICES | Facility: CLINIC | Age: 32
End: 2022-01-19
Payer: COMMERCIAL

## 2022-01-19 ENCOUNTER — TELEPHONE (OUTPATIENT)
Dept: OBGYN | Facility: CLINIC | Age: 32
End: 2022-01-19
Payer: COMMERCIAL

## 2022-01-19 NOTE — TELEPHONE ENCOUNTER
Called patient with help of  to go over surgical prep instructions.     [facility] OB/GYN Surgery Scheduler

## 2022-01-24 ENCOUNTER — LAB (OUTPATIENT)
Dept: LAB | Facility: CLINIC | Age: 32
End: 2022-01-24
Payer: COMMERCIAL

## 2022-01-24 ENCOUNTER — ANESTHESIA EVENT (OUTPATIENT)
Dept: OBGYN | Facility: CLINIC | Age: 32
End: 2022-01-24
Payer: COMMERCIAL

## 2022-01-24 DIAGNOSIS — Z01.818 PRE-OP EXAM: ICD-10-CM

## 2022-01-24 LAB
ABO/RH(D): NORMAL
ANTIBODY SCREEN: NEGATIVE
ERYTHROCYTE [DISTWIDTH] IN BLOOD BY AUTOMATED COUNT: 13.8 % (ref 10–15)
HCT VFR BLD AUTO: 37.1 % (ref 35–47)
HGB BLD-MCNC: 11.8 G/DL (ref 11.7–15.7)
MCH RBC QN AUTO: 29.9 PG (ref 26.5–33)
MCHC RBC AUTO-ENTMCNC: 31.8 G/DL (ref 31.5–36.5)
MCV RBC AUTO: 94 FL (ref 78–100)
PLATELET # BLD AUTO: 221 10E3/UL (ref 150–450)
RBC # BLD AUTO: 3.94 10E6/UL (ref 3.8–5.2)
SPECIMEN EXPIRATION DATE: NORMAL
T PALLIDUM AB SER QL: NONREACTIVE
WBC # BLD AUTO: 9.3 10E3/UL (ref 4–11)

## 2022-01-24 PROCEDURE — 86900 BLOOD TYPING SEROLOGIC ABO: CPT

## 2022-01-24 PROCEDURE — 86850 RBC ANTIBODY SCREEN: CPT

## 2022-01-24 PROCEDURE — 86901 BLOOD TYPING SEROLOGIC RH(D): CPT

## 2022-01-24 PROCEDURE — 36415 COLL VENOUS BLD VENIPUNCTURE: CPT

## 2022-01-24 PROCEDURE — 86780 TREPONEMA PALLIDUM: CPT

## 2022-01-24 PROCEDURE — 85027 COMPLETE CBC AUTOMATED: CPT

## 2022-01-24 RX ORDER — NALOXONE HYDROCHLORIDE 0.4 MG/ML
0.4 INJECTION, SOLUTION INTRAMUSCULAR; INTRAVENOUS; SUBCUTANEOUS
Status: CANCELLED | OUTPATIENT
Start: 2022-01-24

## 2022-01-24 RX ORDER — NALOXONE HYDROCHLORIDE 0.4 MG/ML
0.2 INJECTION, SOLUTION INTRAMUSCULAR; INTRAVENOUS; SUBCUTANEOUS
Status: CANCELLED | OUTPATIENT
Start: 2022-01-24

## 2022-01-25 ENCOUNTER — HOSPITAL ENCOUNTER (INPATIENT)
Facility: CLINIC | Age: 32
LOS: 2 days | Discharge: HOME HEALTH | End: 2022-01-27
Attending: OBSTETRICS & GYNECOLOGY | Admitting: OBSTETRICS & GYNECOLOGY
Payer: COMMERCIAL

## 2022-01-25 ENCOUNTER — APPOINTMENT (OUTPATIENT)
Dept: INTERPRETER SERVICES | Facility: CLINIC | Age: 32
End: 2022-01-25
Payer: COMMERCIAL

## 2022-01-25 ENCOUNTER — VIRTUAL VISIT (OUTPATIENT)
Dept: INTERPRETER SERVICES | Facility: CLINIC | Age: 32
End: 2022-01-25
Payer: COMMERCIAL

## 2022-01-25 ENCOUNTER — ANESTHESIA (OUTPATIENT)
Dept: OBGYN | Facility: CLINIC | Age: 32
End: 2022-01-25
Payer: COMMERCIAL

## 2022-01-25 ENCOUNTER — OFFICE VISIT (OUTPATIENT)
Dept: INTERPRETER SERVICES | Facility: CLINIC | Age: 32
End: 2022-01-25
Payer: COMMERCIAL

## 2022-01-25 DIAGNOSIS — Z98.891 STATUS POST REPEAT LOW TRANSVERSE CESAREAN SECTION: Primary | ICD-10-CM

## 2022-01-25 PROCEDURE — 258N000003 HC RX IP 258 OP 636: Performed by: OBSTETRICS & GYNECOLOGY

## 2022-01-25 PROCEDURE — C1765 ADHESION BARRIER: HCPCS | Performed by: OBSTETRICS & GYNECOLOGY

## 2022-01-25 PROCEDURE — 250N000013 HC RX MED GY IP 250 OP 250 PS 637: Performed by: STUDENT IN AN ORGANIZED HEALTH CARE EDUCATION/TRAINING PROGRAM

## 2022-01-25 PROCEDURE — 272N000001 HC OR GENERAL SUPPLY STERILE: Performed by: OBSTETRICS & GYNECOLOGY

## 2022-01-25 PROCEDURE — 360N000076 HC SURGERY LEVEL 3, PER MIN: Performed by: OBSTETRICS & GYNECOLOGY

## 2022-01-25 PROCEDURE — 88304 TISSUE EXAM BY PATHOLOGIST: CPT | Mod: TC | Performed by: STUDENT IN AN ORGANIZED HEALTH CARE EDUCATION/TRAINING PROGRAM

## 2022-01-25 PROCEDURE — 258N000003 HC RX IP 258 OP 636: Performed by: STUDENT IN AN ORGANIZED HEALTH CARE EDUCATION/TRAINING PROGRAM

## 2022-01-25 PROCEDURE — 0UB60ZZ EXCISION OF LEFT FALLOPIAN TUBE, OPEN APPROACH: ICD-10-PCS | Performed by: OBSTETRICS & GYNECOLOGY

## 2022-01-25 PROCEDURE — 271N000001 HC OR GENERAL SUPPLY NON-STERILE: Performed by: OBSTETRICS & GYNECOLOGY

## 2022-01-25 PROCEDURE — 250N000011 HC RX IP 250 OP 636: Performed by: STUDENT IN AN ORGANIZED HEALTH CARE EDUCATION/TRAINING PROGRAM

## 2022-01-25 PROCEDURE — 250N000009 HC RX 250: Performed by: STUDENT IN AN ORGANIZED HEALTH CARE EDUCATION/TRAINING PROGRAM

## 2022-01-25 PROCEDURE — T1013 SIGN LANG/ORAL INTERPRETER: HCPCS | Mod: U4

## 2022-01-25 PROCEDURE — 250N000013 HC RX MED GY IP 250 OP 250 PS 637: Performed by: OBSTETRICS & GYNECOLOGY

## 2022-01-25 PROCEDURE — 370N000017 HC ANESTHESIA TECHNICAL FEE, PER MIN: Performed by: OBSTETRICS & GYNECOLOGY

## 2022-01-25 PROCEDURE — T1013 SIGN LANG/ORAL INTERPRETER: HCPCS | Mod: U4,TEL,95

## 2022-01-25 PROCEDURE — 250N000011 HC RX IP 250 OP 636: Performed by: ANESTHESIOLOGY

## 2022-01-25 PROCEDURE — 250N000011 HC RX IP 250 OP 636: Performed by: OBSTETRICS & GYNECOLOGY

## 2022-01-25 PROCEDURE — 120N000002 HC R&B MED SURG/OB UMMC

## 2022-01-25 PROCEDURE — C9290 INJ, BUPIVACAINE LIPOSOME: HCPCS | Performed by: ANESTHESIOLOGY

## 2022-01-25 PROCEDURE — 59515 CESAREAN DELIVERY: CPT | Mod: GC | Performed by: OBSTETRICS & GYNECOLOGY

## 2022-01-25 PROCEDURE — 999N000141 HC STATISTIC PRE-PROCEDURE NURSING ASSESSMENT: Performed by: OBSTETRICS & GYNECOLOGY

## 2022-01-25 PROCEDURE — 710N000010 HC RECOVERY PHASE 1, LEVEL 2, PER MIN: Performed by: OBSTETRICS & GYNECOLOGY

## 2022-01-25 PROCEDURE — 88304 TISSUE EXAM BY PATHOLOGIST: CPT | Mod: 26 | Performed by: PATHOLOGY

## 2022-01-25 RX ORDER — AMOXICILLIN 250 MG
2 CAPSULE ORAL 2 TIMES DAILY
Status: DISCONTINUED | OUTPATIENT
Start: 2022-01-25 | End: 2022-01-27 | Stop reason: HOSPADM

## 2022-01-25 RX ORDER — LABETALOL HYDROCHLORIDE 5 MG/ML
10 INJECTION, SOLUTION INTRAVENOUS
Status: DISCONTINUED | OUTPATIENT
Start: 2022-01-25 | End: 2022-01-25 | Stop reason: HOSPADM

## 2022-01-25 RX ORDER — MISOPROSTOL 200 UG/1
400 TABLET ORAL
Status: DISCONTINUED | OUTPATIENT
Start: 2022-01-25 | End: 2022-01-27 | Stop reason: HOSPADM

## 2022-01-25 RX ORDER — METOCLOPRAMIDE HYDROCHLORIDE 5 MG/ML
10 INJECTION INTRAMUSCULAR; INTRAVENOUS EVERY 6 HOURS PRN
Status: DISCONTINUED | OUTPATIENT
Start: 2022-01-25 | End: 2022-01-27 | Stop reason: HOSPADM

## 2022-01-25 RX ORDER — SODIUM CHLORIDE, SODIUM LACTATE, POTASSIUM CHLORIDE, CALCIUM CHLORIDE 600; 310; 30; 20 MG/100ML; MG/100ML; MG/100ML; MG/100ML
INJECTION, SOLUTION INTRAVENOUS CONTINUOUS
Status: DISCONTINUED | OUTPATIENT
Start: 2022-01-25 | End: 2022-01-25 | Stop reason: HOSPADM

## 2022-01-25 RX ORDER — MODIFIED LANOLIN
OINTMENT (GRAM) TOPICAL
Status: DISCONTINUED | OUTPATIENT
Start: 2022-01-25 | End: 2022-01-27 | Stop reason: HOSPADM

## 2022-01-25 RX ORDER — OXYCODONE HYDROCHLORIDE 5 MG/1
5 TABLET ORAL EVERY 4 HOURS PRN
Status: DISCONTINUED | OUTPATIENT
Start: 2022-01-25 | End: 2022-01-25

## 2022-01-25 RX ORDER — FLUMAZENIL 0.1 MG/ML
0.2 INJECTION, SOLUTION INTRAVENOUS
Status: DISCONTINUED | OUTPATIENT
Start: 2022-01-25 | End: 2022-01-25 | Stop reason: HOSPADM

## 2022-01-25 RX ORDER — CEFAZOLIN SODIUM 2 G/100ML
2 INJECTION, SOLUTION INTRAVENOUS SEE ADMIN INSTRUCTIONS
Status: DISCONTINUED | OUTPATIENT
Start: 2022-01-25 | End: 2022-01-25 | Stop reason: HOSPADM

## 2022-01-25 RX ORDER — TRANEXAMIC ACID 10 MG/ML
1 INJECTION, SOLUTION INTRAVENOUS EVERY 30 MIN PRN
Status: DISCONTINUED | OUTPATIENT
Start: 2022-01-25 | End: 2022-01-27 | Stop reason: HOSPADM

## 2022-01-25 RX ORDER — PROCHLORPERAZINE MALEATE 10 MG
10 TABLET ORAL EVERY 6 HOURS PRN
Status: DISCONTINUED | OUTPATIENT
Start: 2022-01-25 | End: 2022-01-27 | Stop reason: HOSPADM

## 2022-01-25 RX ORDER — DIPHENHYDRAMINE HCL 25 MG
25 CAPSULE ORAL EVERY 6 HOURS PRN
Status: DISCONTINUED | OUTPATIENT
Start: 2022-01-25 | End: 2022-01-27 | Stop reason: HOSPADM

## 2022-01-25 RX ORDER — CARBOPROST TROMETHAMINE 250 UG/ML
250 INJECTION, SOLUTION INTRAMUSCULAR
Status: DISCONTINUED | OUTPATIENT
Start: 2022-01-25 | End: 2022-01-27 | Stop reason: HOSPADM

## 2022-01-25 RX ORDER — ACETAMINOPHEN 325 MG/1
975 TABLET ORAL ONCE
Status: COMPLETED | OUTPATIENT
Start: 2022-01-25 | End: 2022-01-25

## 2022-01-25 RX ORDER — LIDOCAINE 40 MG/G
CREAM TOPICAL
Status: DISCONTINUED | OUTPATIENT
Start: 2022-01-25 | End: 2022-01-25

## 2022-01-25 RX ORDER — LIDOCAINE 4 G/G
1 PATCH TOPICAL
Status: DISCONTINUED | OUTPATIENT
Start: 2022-01-26 | End: 2022-01-27 | Stop reason: HOSPADM

## 2022-01-25 RX ORDER — METHYLERGONOVINE MALEATE 0.2 MG/ML
200 INJECTION INTRAVENOUS
Status: DISCONTINUED | OUTPATIENT
Start: 2022-01-25 | End: 2022-01-25 | Stop reason: HOSPADM

## 2022-01-25 RX ORDER — BUPIVACAINE HYDROCHLORIDE 2.5 MG/ML
INJECTION, SOLUTION EPIDURAL; INFILTRATION; INTRACAUDAL
Status: DISCONTINUED | OUTPATIENT
Start: 2022-01-25 | End: 2022-01-25

## 2022-01-25 RX ORDER — PROCHLORPERAZINE 25 MG
25 SUPPOSITORY, RECTAL RECTAL EVERY 12 HOURS PRN
Status: DISCONTINUED | OUTPATIENT
Start: 2022-01-25 | End: 2022-01-27 | Stop reason: HOSPADM

## 2022-01-25 RX ORDER — NALOXONE HYDROCHLORIDE 0.4 MG/ML
0.2 INJECTION, SOLUTION INTRAMUSCULAR; INTRAVENOUS; SUBCUTANEOUS
Status: DISCONTINUED | OUTPATIENT
Start: 2022-01-25 | End: 2022-01-27 | Stop reason: HOSPADM

## 2022-01-25 RX ORDER — OXYTOCIN 10 [USP'U]/ML
10 INJECTION, SOLUTION INTRAMUSCULAR; INTRAVENOUS
Status: DISCONTINUED | OUTPATIENT
Start: 2022-01-25 | End: 2022-01-25 | Stop reason: HOSPADM

## 2022-01-25 RX ORDER — MISOPROSTOL 200 UG/1
800 TABLET ORAL
Status: DISCONTINUED | OUTPATIENT
Start: 2022-01-25 | End: 2022-01-25 | Stop reason: HOSPADM

## 2022-01-25 RX ORDER — OXYCODONE HYDROCHLORIDE 5 MG/1
5 TABLET ORAL EVERY 4 HOURS PRN
Status: DISCONTINUED | OUTPATIENT
Start: 2022-01-25 | End: 2022-01-27 | Stop reason: HOSPADM

## 2022-01-25 RX ORDER — MISOPROSTOL 200 UG/1
400 TABLET ORAL
Status: DISCONTINUED | OUTPATIENT
Start: 2022-01-25 | End: 2022-01-25 | Stop reason: HOSPADM

## 2022-01-25 RX ORDER — POLYETHYLENE GLYCOL 3350 17 G/17G
17 POWDER, FOR SOLUTION ORAL DAILY
Status: DISCONTINUED | OUTPATIENT
Start: 2022-01-26 | End: 2022-01-27 | Stop reason: HOSPADM

## 2022-01-25 RX ORDER — METHYLERGONOVINE MALEATE 0.2 MG/ML
200 INJECTION INTRAVENOUS
Status: DISCONTINUED | OUTPATIENT
Start: 2022-01-25 | End: 2022-01-27 | Stop reason: HOSPADM

## 2022-01-25 RX ORDER — AMOXICILLIN 250 MG
1 CAPSULE ORAL 2 TIMES DAILY
Status: DISCONTINUED | OUTPATIENT
Start: 2022-01-25 | End: 2022-01-27 | Stop reason: HOSPADM

## 2022-01-25 RX ORDER — LIDOCAINE 40 MG/G
CREAM TOPICAL
Status: DISCONTINUED | OUTPATIENT
Start: 2022-01-25 | End: 2022-01-25 | Stop reason: HOSPADM

## 2022-01-25 RX ORDER — DEXTROSE, SODIUM CHLORIDE, SODIUM LACTATE, POTASSIUM CHLORIDE, AND CALCIUM CHLORIDE 5; .6; .31; .03; .02 G/100ML; G/100ML; G/100ML; G/100ML; G/100ML
INJECTION, SOLUTION INTRAVENOUS CONTINUOUS
Status: DISCONTINUED | OUTPATIENT
Start: 2022-01-25 | End: 2022-01-27 | Stop reason: HOSPADM

## 2022-01-25 RX ORDER — BUPIVACAINE HYDROCHLORIDE 7.5 MG/ML
INJECTION, SOLUTION INTRASPINAL
Status: COMPLETED | OUTPATIENT
Start: 2022-01-25 | End: 2022-01-25

## 2022-01-25 RX ORDER — BISACODYL 10 MG
10 SUPPOSITORY, RECTAL RECTAL DAILY PRN
Status: DISCONTINUED | OUTPATIENT
Start: 2022-01-27 | End: 2022-01-27 | Stop reason: HOSPADM

## 2022-01-25 RX ORDER — CITRIC ACID/SODIUM CITRATE 334-500MG
30 SOLUTION, ORAL ORAL
Status: COMPLETED | OUTPATIENT
Start: 2022-01-25 | End: 2022-01-25

## 2022-01-25 RX ORDER — HYDROMORPHONE HCL IN WATER/PF 6 MG/30 ML
0.2 PATIENT CONTROLLED ANALGESIA SYRINGE INTRAVENOUS EVERY 5 MIN PRN
Status: DISCONTINUED | OUTPATIENT
Start: 2022-01-25 | End: 2022-01-25 | Stop reason: HOSPADM

## 2022-01-25 RX ORDER — KETOROLAC TROMETHAMINE 30 MG/ML
INJECTION, SOLUTION INTRAMUSCULAR; INTRAVENOUS PRN
Status: DISCONTINUED | OUTPATIENT
Start: 2022-01-25 | End: 2022-01-25

## 2022-01-25 RX ORDER — MORPHINE SULFATE 1 MG/ML
INJECTION, SOLUTION EPIDURAL; INTRATHECAL; INTRAVENOUS
Status: COMPLETED | OUTPATIENT
Start: 2022-01-25 | End: 2022-01-25

## 2022-01-25 RX ORDER — ONDANSETRON 2 MG/ML
4 INJECTION INTRAMUSCULAR; INTRAVENOUS EVERY 6 HOURS PRN
Status: DISCONTINUED | OUTPATIENT
Start: 2022-01-25 | End: 2022-01-27 | Stop reason: HOSPADM

## 2022-01-25 RX ORDER — TRANEXAMIC ACID 10 MG/ML
1 INJECTION, SOLUTION INTRAVENOUS EVERY 30 MIN PRN
Status: DISCONTINUED | OUTPATIENT
Start: 2022-01-25 | End: 2022-01-25 | Stop reason: HOSPADM

## 2022-01-25 RX ORDER — OXYTOCIN/0.9 % SODIUM CHLORIDE 30/500 ML
PLASTIC BAG, INJECTION (ML) INTRAVENOUS CONTINUOUS PRN
Status: DISCONTINUED | OUTPATIENT
Start: 2022-01-25 | End: 2022-01-25

## 2022-01-25 RX ORDER — NALOXONE HYDROCHLORIDE 0.4 MG/ML
0.4 INJECTION, SOLUTION INTRAMUSCULAR; INTRAVENOUS; SUBCUTANEOUS
Status: DISCONTINUED | OUTPATIENT
Start: 2022-01-25 | End: 2022-01-27 | Stop reason: HOSPADM

## 2022-01-25 RX ORDER — MISOPROSTOL 200 UG/1
800 TABLET ORAL
Status: DISCONTINUED | OUTPATIENT
Start: 2022-01-25 | End: 2022-01-27 | Stop reason: HOSPADM

## 2022-01-25 RX ORDER — DIPHENHYDRAMINE HYDROCHLORIDE 50 MG/ML
25 INJECTION INTRAMUSCULAR; INTRAVENOUS EVERY 6 HOURS PRN
Status: DISCONTINUED | OUTPATIENT
Start: 2022-01-25 | End: 2022-01-27 | Stop reason: HOSPADM

## 2022-01-25 RX ORDER — SODIUM CHLORIDE, SODIUM LACTATE, POTASSIUM CHLORIDE, CALCIUM CHLORIDE 600; 310; 30; 20 MG/100ML; MG/100ML; MG/100ML; MG/100ML
INJECTION, SOLUTION INTRAVENOUS CONTINUOUS PRN
Status: DISCONTINUED | OUTPATIENT
Start: 2022-01-25 | End: 2022-01-25

## 2022-01-25 RX ORDER — CEFAZOLIN SODIUM 2 G/100ML
2 INJECTION, SOLUTION INTRAVENOUS
Status: COMPLETED | OUTPATIENT
Start: 2022-01-25 | End: 2022-01-25

## 2022-01-25 RX ORDER — FENTANYL CITRATE 50 UG/ML
INJECTION, SOLUTION INTRAMUSCULAR; INTRAVENOUS
Status: COMPLETED | OUTPATIENT
Start: 2022-01-25 | End: 2022-01-25

## 2022-01-25 RX ORDER — OXYTOCIN 10 [USP'U]/ML
10 INJECTION, SOLUTION INTRAMUSCULAR; INTRAVENOUS
Status: DISCONTINUED | OUTPATIENT
Start: 2022-01-31 | End: 2022-01-27 | Stop reason: HOSPADM

## 2022-01-25 RX ORDER — CARBOPROST TROMETHAMINE 250 UG/ML
250 INJECTION, SOLUTION INTRAMUSCULAR
Status: DISCONTINUED | OUTPATIENT
Start: 2022-01-25 | End: 2022-01-25 | Stop reason: HOSPADM

## 2022-01-25 RX ORDER — ACETAMINOPHEN 325 MG/1
975 TABLET ORAL EVERY 6 HOURS
Status: DISCONTINUED | OUTPATIENT
Start: 2022-01-25 | End: 2022-01-27 | Stop reason: HOSPADM

## 2022-01-25 RX ORDER — ONDANSETRON 2 MG/ML
INJECTION INTRAMUSCULAR; INTRAVENOUS PRN
Status: DISCONTINUED | OUTPATIENT
Start: 2022-01-25 | End: 2022-01-25

## 2022-01-25 RX ORDER — OXYTOCIN/0.9 % SODIUM CHLORIDE 30/500 ML
340 PLASTIC BAG, INJECTION (ML) INTRAVENOUS CONTINUOUS PRN
Status: DISCONTINUED | OUTPATIENT
Start: 2022-01-25 | End: 2022-01-27 | Stop reason: HOSPADM

## 2022-01-25 RX ORDER — METOCLOPRAMIDE 10 MG/1
10 TABLET ORAL EVERY 6 HOURS PRN
Status: DISCONTINUED | OUTPATIENT
Start: 2022-01-25 | End: 2022-01-27 | Stop reason: HOSPADM

## 2022-01-25 RX ORDER — OXYTOCIN/0.9 % SODIUM CHLORIDE 30/500 ML
100-340 PLASTIC BAG, INJECTION (ML) INTRAVENOUS CONTINUOUS PRN
Status: DISCONTINUED | OUTPATIENT
Start: 2022-01-31 | End: 2022-01-27 | Stop reason: HOSPADM

## 2022-01-25 RX ORDER — FENTANYL CITRATE-0.9 % NACL/PF 10 MCG/ML
PLASTIC BAG, INJECTION (ML) INTRAVENOUS CONTINUOUS PRN
Status: DISCONTINUED | OUTPATIENT
Start: 2022-01-25 | End: 2022-01-25

## 2022-01-25 RX ORDER — ONDANSETRON 4 MG/1
4 TABLET, ORALLY DISINTEGRATING ORAL EVERY 6 HOURS PRN
Status: DISCONTINUED | OUTPATIENT
Start: 2022-01-25 | End: 2022-01-27 | Stop reason: HOSPADM

## 2022-01-25 RX ORDER — KETOROLAC TROMETHAMINE 30 MG/ML
30 INJECTION, SOLUTION INTRAMUSCULAR; INTRAVENOUS EVERY 6 HOURS
Status: COMPLETED | OUTPATIENT
Start: 2022-01-25 | End: 2022-01-26

## 2022-01-25 RX ORDER — OXYTOCIN/0.9 % SODIUM CHLORIDE 30/500 ML
340 PLASTIC BAG, INJECTION (ML) INTRAVENOUS CONTINUOUS PRN
Status: DISCONTINUED | OUTPATIENT
Start: 2022-01-25 | End: 2022-01-25 | Stop reason: HOSPADM

## 2022-01-25 RX ORDER — FENTANYL CITRATE 50 UG/ML
25 INJECTION, SOLUTION INTRAMUSCULAR; INTRAVENOUS EVERY 5 MIN PRN
Status: DISCONTINUED | OUTPATIENT
Start: 2022-01-25 | End: 2022-01-25 | Stop reason: HOSPADM

## 2022-01-25 RX ORDER — SIMETHICONE 80 MG
80 TABLET,CHEWABLE ORAL 4 TIMES DAILY PRN
Status: DISCONTINUED | OUTPATIENT
Start: 2022-01-25 | End: 2022-01-27 | Stop reason: HOSPADM

## 2022-01-25 RX ORDER — HYDROCORTISONE 2.5 %
CREAM (GRAM) TOPICAL 3 TIMES DAILY PRN
Status: DISCONTINUED | OUTPATIENT
Start: 2022-01-25 | End: 2022-01-27 | Stop reason: HOSPADM

## 2022-01-25 RX ORDER — OXYTOCIN 10 [USP'U]/ML
10 INJECTION, SOLUTION INTRAMUSCULAR; INTRAVENOUS
Status: DISCONTINUED | OUTPATIENT
Start: 2022-01-25 | End: 2022-01-27 | Stop reason: HOSPADM

## 2022-01-25 RX ORDER — IBUPROFEN 800 MG/1
800 TABLET, FILM COATED ORAL EVERY 6 HOURS
Status: DISCONTINUED | OUTPATIENT
Start: 2022-01-26 | End: 2022-01-27 | Stop reason: HOSPADM

## 2022-01-25 RX ADMIN — ACETAMINOPHEN 975 MG: 325 TABLET, FILM COATED ORAL at 08:06

## 2022-01-25 RX ADMIN — Medication 2 G: at 08:59

## 2022-01-25 RX ADMIN — Medication 100 MCG/MIN: at 09:06

## 2022-01-25 RX ADMIN — BUPIVACAINE HYDROCHLORIDE 20 ML: 2.5 INJECTION, SOLUTION EPIDURAL; INFILTRATION; INTRACAUDAL at 10:15

## 2022-01-25 RX ADMIN — FENTANYL CITRATE 15 MCG: 50 INJECTION, SOLUTION INTRAMUSCULAR; INTRAVENOUS at 09:05

## 2022-01-25 RX ADMIN — BUPIVACAINE 20 ML: 13.3 INJECTION, SUSPENSION, LIPOSOMAL INFILTRATION at 10:15

## 2022-01-25 RX ADMIN — KETOROLAC TROMETHAMINE 30 MG: 30 INJECTION, SOLUTION INTRAMUSCULAR; INTRAVENOUS at 16:52

## 2022-01-25 RX ADMIN — OXYTOCIN-SODIUM CHLORIDE 0.9% IV SOLN 30 UNIT/500ML 300 ML/HR: 30-0.9/5 SOLUTION at 09:35

## 2022-01-25 RX ADMIN — PHENYLEPHRINE HYDROCHLORIDE 100 MCG: 10 INJECTION INTRAVENOUS at 09:13

## 2022-01-25 RX ADMIN — SODIUM CHLORIDE, POTASSIUM CHLORIDE, SODIUM LACTATE AND CALCIUM CHLORIDE: 600; 310; 30; 20 INJECTION, SOLUTION INTRAVENOUS at 08:55

## 2022-01-25 RX ADMIN — DOCUSATE SODIUM AND SENNOSIDES 1 TABLET: 8.6; 5 TABLET ORAL at 20:42

## 2022-01-25 RX ADMIN — ACETAMINOPHEN 975 MG: 325 TABLET, FILM COATED ORAL at 22:53

## 2022-01-25 RX ADMIN — SODIUM CITRATE AND CITRIC ACID MONOHYDRATE 30 ML: 500; 334 SOLUTION ORAL at 08:42

## 2022-01-25 RX ADMIN — MORPHINE SULFATE 0.15 MG: 1 INJECTION EPIDURAL; INTRATHECAL; INTRAVENOUS at 09:05

## 2022-01-25 RX ADMIN — PHENYLEPHRINE HYDROCHLORIDE 100 MCG: 10 INJECTION INTRAVENOUS at 09:53

## 2022-01-25 RX ADMIN — KETOROLAC TROMETHAMINE 30 MG: 30 INJECTION, SOLUTION INTRAMUSCULAR at 10:10

## 2022-01-25 RX ADMIN — PHENYLEPHRINE HYDROCHLORIDE 100 MCG: 10 INJECTION INTRAVENOUS at 09:43

## 2022-01-25 RX ADMIN — ONDANSETRON 4 MG: 2 INJECTION INTRAMUSCULAR; INTRAVENOUS at 09:14

## 2022-01-25 RX ADMIN — KETOROLAC TROMETHAMINE 30 MG: 30 INJECTION, SOLUTION INTRAMUSCULAR; INTRAVENOUS at 22:43

## 2022-01-25 RX ADMIN — BUPIVACAINE HYDROCHLORIDE IN DEXTROSE 1.8 ML: 7.5 INJECTION, SOLUTION SUBARACHNOID at 09:05

## 2022-01-25 RX ADMIN — SODIUM CHLORIDE, POTASSIUM CHLORIDE, SODIUM LACTATE AND CALCIUM CHLORIDE: 600; 310; 30; 20 INJECTION, SOLUTION INTRAVENOUS at 08:01

## 2022-01-25 ASSESSMENT — MIFFLIN-ST. JEOR: SCORE: 1586.19

## 2022-01-25 ASSESSMENT — LIFESTYLE VARIABLES: TOBACCO_USE: 0

## 2022-01-25 NOTE — PROGRESS NOTES
Pt arrived @ 0645 for scheduled c/s. VSS, afebrile. Pt placed on EFM, see flowsheet for interpretation. Admission question completed.

## 2022-01-25 NOTE — H&P
"  2022    Sultana Smallwood  [id number]      OB Admit History & Physical      Ms. Alcon Smallwood  is here for a scheduled repeat CS.    She has noticed +FM, no vaginal bleeding or LOF. Denies contractions.     Patient's last menstrual period was 2021.   Her Estimated Date of Delivery: 2022  , making her 39w1d  wks.      Estimated body mass index is 35.44 kg/m  as calculated from the following:    Height as of this encounter: 1.6 m (5' 2.99\").    Weight as of this encounter: 90.7 kg (200 lb).  Her prenatal course has been complicated by COVID-19 infection on 21, she had mild symptoms that did not require treatment or hospitalizaiton. .    See prenatal for labs. Negative for GBBS, Rubella  Immune, RH postive    Estimated fetal weight= 3250 gm       She is a 31 year old     Her OB history:   OB History    Para Term  AB Living   2 1 1 0 0 1   SAB IAB Ectopic Multiple Live Births   0 0 0 0 1      # Outcome Date GA Lbr Kemal/2nd Weight Sex Delivery Anes PTL Lv   2 Current            1 Term 10/15/18 40w0d  3.374 kg (7 lb 7 oz) F -SEC  N SONAL        No past medical history on file.       Past Surgical History:   Procedure Laterality Date      SECTION           No current outpatient medications on file.       Allergies: Patient has no known allergies.      REVIEW OF SYSTEMS:  NEUROLOGIC:  Negative  EYES:  Negative  ENT:  Negative  GI:  Negative  BREAST:  Negative  :  Negative  GYN:  Negative  CV:  Negative  PULMONARY:  Negative  MUSCULOSKELETAL:  Negative  PSYCH:  Negative        Social History     Socioeconomic History     Marital status: Single     Spouse name: Not on file     Number of children: Not on file     Years of education: Not on file     Highest education level: Not on file   Occupational History     Not on file   Tobacco Use     Smoking status: Never Smoker     Smokeless tobacco: Never Used   Vaping Use     Vaping Use: Never used   Substance and " "Sexual Activity     Alcohol use: Not Currently     Drug use: Never     Sexual activity: Yes     Partners: Male   Other Topics Concern     Not on file   Social History Narrative     Not on file     Social Determinants of Health     Financial Resource Strain: Not on file   Food Insecurity: Not on file   Transportation Needs: Not on file   Physical Activity: Not on file   Stress: Not on file   Social Connections: Not on file   Intimate Partner Violence: Not on file   Housing Stability: Not on file      Family History   Problem Relation Age of Onset     Hyperlipidemia Mother      Heart Surgery Sister      Vitals:    /69   Pulse 92   Temp 98.3  F (36.8  C) (Oral)   Resp 16   Ht 1.6 m (5' 2.99\")   Wt 90.7 kg (200 lb)   LMP 2021   BMI 35.44 kg/m       /mod/+accels/no decels   [place]: irregular   SVE: deferred     General: Patient alert and oriented, no acute distress  CV: no peripheral edema or cyanosis  Resp: normal respiratory effort and equal lung expansion  Abdomen:gravid, non-tender   SVE: deferred   Ext: non-tender, no edema    BSUS documented a cephalic presentation     Component      Latest Ref Rng & Units 2022   WBC      4.0 - 11.0 10e3/uL 9.3   RBC Count      3.80 - 5.20 10e6/uL 3.94   Hemoglobin      11.7 - 15.7 g/dL 11.8   Hematocrit      35.0 - 47.0 % 37.1   MCV      78 - 100 fL 94   MCH      26.5 - 33.0 pg 29.9   MCHC      31.5 - 36.5 g/dL 31.8   RDW      10.0 - 15.0 % 13.8   Platelet Count      150 - 450 10e3/uL 221   ABO/Rh(D)       O POS   Antibody Screen      Negative Negative   SPECIMEN EXPIRATION DATE          Rubella Bonnie IgG Instrument Value      <0.90 Index    Rubella Antibody IgG      Negative    Treponema Antibodies      Nonreactive Nonreactive     Assessment and Plan:   Sultana Smallwood is a 32 YO  who presents at 39w1d for scheduled repeat  section, pregnancy complicated by COVID-19 infection   1. +FWB, reactive NST   2. Routine " prenatal care  -Rh+/RI/Tdap UTD   3. Scheduled repeat  section  -Patient previously counseled on risks/benefits of repeat  vs TOLAC and would like to proceed with a scheduled repeat CS   -Risks of  reviewed including risk of bleeding, infection, damage to bowel/bladder surrounding structure, risk of fetal injury and risk of poor wound healing. Surgical consent signed. Risks of blood transfusion reviewed and blood consent signed as well.     Dispo: proceed with scheduled CS      Charisse Pereira MD   Dept of OB/GYN  2022

## 2022-01-25 NOTE — ANESTHESIA PROCEDURE NOTES
TAP Procedure Note    Pre-Procedure   Staff -        Anesthesiologist:  Rafy Baron DO       Resident/Fellow: Diego Worthy       Other Anesthesia Staff: Ad Keating MD       Performed By: resident and with residents       Procedure performed by resident/fellow/CRNA in presence of a teaching physician.         Location: OR       Procedure Start/Stop Times: 1/25/2022 10:00 AM and 1/25/2022 10:15 AM       Pre-Anesthestic Checklist: patient identified, IV checked, site marked, risks and benefits discussed, informed consent, monitors and equipment checked, pre-op evaluation, at physician/surgeon's request and post-op pain management  Timeout:       Correct Patient: Yes        Correct Procedure: Yes        Correct Site: Yes        Correct Position: Yes        Correct Laterality: Yes        Site Marked: Yes  Procedure Documentation  Procedure: TAP       Diagnosis: POST-OPERATIVE PAIN CONTROL       Laterality: bilateral       Patient Position: supine       Patient Prep/Sterile Barriers: sterile gloves, mask       Skin prep: Chloraprep       Needle Type: short bevel       Needle Gauge: 21.        Needle Length (millimeters): 100        Ultrasound guided       1. Ultrasound was used to identify targeted nerve, plexus, vascular marker, or fascial plane and place a needle adjacent to it in real-time.       2. Ultrasound was used to visualize the spread of anesthetic in close proximity to the above referenced structure.       3. A permanent image is entered into the patient's record.       4. The visualized anatomic structures appeared normal.       5. There were no apparent abnormal pathologic findings.    Assessment/Narrative         The placement was negative for: blood aspirated, painful injection and site bleeding       Paresthesias: No.     Bolus given via needle..        Secured via.        Insertion/Infusion Method: Single Shot       Complications: none    Medication(s) Administered    Bupivacaine 0.25% PF (Infiltration), 20 mL  Bupivacaine liposome (Exparel) 1.3% LA inj susp (Infiltration), 20 mL  Medication Administration Time: 1/25/2022 10:15 AM

## 2022-01-25 NOTE — OP NOTE
[facility]   OPERATIVE NOTE:  SECTION    Surgery Date:  2022   Surgeon(s): Charisse Pereira MD  Assistants:  Jennifer Gomes MD, PGY-2; Rudy Lennon MS3     Preoperative Diagnoses:  -  at 39w1d   - History of  section x1     Postoperative diagnoses:  -  at 39w1d, now delivered  - H/o CS x2      Procedure performed:  Scheduled repeat low segment transverse  section via Pfannenstiel skin incision with double layer uterine closure; left paratubal cyst excision     Anesthesia:  Spinal with duramorph  QBL (mL): 750 mL  Fluid replacement: 500 mL crystalloid  Urine output: 300 mL clear   Specimens: Placenta (not sent), cord segment, left paratubal cyst   Complications: None apparent        Operative findings:   - Mild recto-fascial adhesions. No intraabdominal adhesions.  No abdominal wall adhesions.   - Single, liveborn female infant at 0933 hours on 2022. No nuchal. Apgars of 9 and 9 at one and five minutes. Birth weight: pending.  Fetal presentation: Vertex. Amniotic fluid: Clear.     - Placenta intact with 3 vessel cord.     - Normal appearing uterus, normal fallopian tubes with several small left paratubal cysts, one removed and sent to pathology, normal appearing ovaries.   - Good uterine tone.     Indication: Sultana Smallwood is a 31 year old  who was admitted at 39w1d by LMP c/w 12w2d ultrasound for scheduled repeat  section. She was initially planning for TOLAC but was found to be breech in clinic so repeat CS was planned. Today in triage was found to be cephalic, but after counseling elected to proceed with repeat CS rather than TOLAC. The risks, benefits, and alternatives of  delivery were explained and the patient agreed to proceed.     Procedure details:  After obtaining informed consent with assistance of in person , the patient was taken to the operating room. She received Ancef  prior to the skin incision. She was placed in the dorsal supine position with a leftward tilt and prepped and draped in the usual sterile fashion.     Following test of adequate spinal anesthesia, the abdomen was entered through a transverse skin incision through her previous scar. The skin incision was made sharply and carried through the subcutaneous tissue to the fascia. Fascia was incised in the midline and extended laterally with the Aguila scissors. The superior margin of the fascial incision was grasped with Kocher clamps and dissected from the underlying muscle sharp and blunt dissecton, which was then repeated at the lower margin of the fascial incision. The muscle was  in the midline.      The peritoneum was elevated and entered sharply with Metzenbaum scissors. The abdomen was evaluated for any intraabdominal adhesions and none were noted. The peritoneal opening was extended by with digital dissection with care to avoid the bladder, peritoneal bands were taken down carefully with cautery. A bladder blade was placed. The lower segment of the uterus was opened sharply in a transverse fashion and extended with digital pressure. The infant's head was elevated to the level of the hysterotomy and was delivered atraumatically. The cord was doubly clamped after 60 seconds and cut and the infant was handed off to the waiting RN staff. A segment of the cord was cut and set aside for cord gases if needed.     The placenta was expressed. The uterus was exteriorized from the abdomen and cleared of all clots and debris. The uterus was massaged and was noted to be firm.  Oxytocin was given through the running IV. The hysterotomy was repaired with 0-vicryl suture in a running locked fashion. A second layer of 0-vicryl suture was used to imbricate the incision and good hemostasis was achieved. The bladder flap was inspected and found to be hemostatic. The posterior cul-de-sac was cleared of all clot and debris and  the uterus was returned to the abdomen.      The bilateral pericolic gutters were cleared of clot and debris. The hysterotomy was again inspected and found to be hemostatic. The abdominal wall was examined and also found to be hemostatic. The fascia was closed with a running suture of 0-vicryl.  Subcutaneous tissue was irrigated. Areas that were not hemostatic were controlled with cautery. The subcutaneous tissue was reapproximated with 3-0 plain catgut suture. The skin was closed with 4-0 monocryl. The patient tolerated the procedure well and was taken to the recovery room in stable condition. All sponge, needle and instrument counts were correct x2. Dr. Pereira was present for the entire procedure.       Jennifer Gomes MD  OB/GYN Resident PGY-2  11:00 AM January 25, 2022

## 2022-01-25 NOTE — BRIEF OP NOTE
[facility]   BRIEF OPERATIVE NOTE:  SECTION    Surgery Date:  2022   Surgeon(s): Charisse Pereira MD  Assistants:  Jennifer Gomes MD, PGY-2; Rudy Lennon MS3    Preoperative Diagnoses:  -  at 39w1d   - History of  section x1    Postoperative diagnoses:  -  at 39w1d, now delivered  - H/o CS x2     Procedure performed:  Scheduled repeat low segment transverse  section via Pfannenstiel skin incision with double layer uterine closure; left paratubal cyst excision    Anesthesia:  Spinal with duramorph  QBL (mL): 750 mL  Fluid replacement: 500 mL crystalloid  Urine output: 300 mL clear   Specimens: Placenta (not sent), cord segment, left paratubal cyst   Complications: None apparent       Operative findings:   - Mild recto-fascial adhesions. No intraabdominal adhesions.  No abdominal wall adhesions.   - Single, liveborn female infant at 0933 hours on 2022. No nuchal. Apgars of 9 and 9 at one and five minutes. Birth weight: pending.  Fetal presentation: Vertex. Amniotic fluid: Clear.     - Placenta intact with 3 vessel cord.     - Normal appearing uterus, normal fallopian tubes with several small left paratubal cysts, one removed and sent to pathology, normal appearing ovaries.   - Good uterine tone.     Transferred to PACU in stable condition.     Jennifer Gomes MD  OB/GYN Resident PGY-2  10:21 AM 2022

## 2022-01-25 NOTE — ANESTHESIA POSTPROCEDURE EVALUATION
Patient: Sultana Smallwood    Procedure: Procedure(s):   SECTION       Diagnosis:Previous  delivery affecting pregnancy [O34.219]  Diagnosis Additional Information: No value filed.    Anesthesia Type:  Spinal    Note:  Disposition: Admission   Postop Pain Control: Uneventful            Sign Out: Well controlled pain   PONV: No   Neuro/Psych: Uneventful            Sign Out: Acceptable/Baseline neuro status   Airway/Respiratory: Uneventful            Sign Out: Acceptable/Baseline resp. status   CV/Hemodynamics: Uneventful            Sign Out: Acceptable CV status; No obvious hypovolemia; No obvious fluid overload   Other NRE: NONE   DID A NON-ROUTINE EVENT OCCUR? No           Last vitals:  Vitals Value Taken Time   /63 22 1232   Temp 36.9  C (98.5  F) 22 1232   Pulse 85 22 1232   Resp 16 22 1232   SpO2 98 % 22 1232       Electronically Signed By: Rafy Baron DO  2022  12:34 PM

## 2022-01-25 NOTE — PROGRESS NOTES
SPIRITUAL HEALTH SERVICES  SPIRITUAL ASSESSMENT Progress Note  [facility] ([facility]) [facility]     REFERRAL SOURCE: Routine request by patient at admission.     I introduced SHS to patient and partner through interpretive services.  We shared prayer together per patient's request.     PLAN: No immediate follow-up planned but patient is aware she can request follow-up support at anytime during hospitalization.     Zia Yu MDiv.    Pager [phone]    [facility] remains available 24/7 for emergent requests/referrals, either by having the switchboard page the on-call  or by entering an ASAP/STAT consult in Epic (this will also page the on-call ).

## 2022-01-25 NOTE — PLAN OF CARE
Pt is  39.1 wks here for repeat c/section.  Covid recovered.  Prep started by ney vital.  Shift report received.  Prep completed.  Fhr 130 mod w accels and no decels.  Not feeling ctxs.  Irregular/mild/soft.  Intact. No vag bleeding.  +FM.  Vss. No pain.  Seen by MDA Team.  Consented for marce block.  Seen by Dr Pereira.  Consented for c/section and blood.  Per bedside us cephalic.  David Durant interpeter present throughout.  Vignesh, spouse, present and supportive.  Paper consents signed and in chart.  Ltv=366.  O+ mom.  Cord blood sent.  Left paratubal cyst to pathology.

## 2022-01-25 NOTE — PROGRESS NOTES
Data: Sultana Smallwood transferred to 7130 via wheelchair at 1300. Baby transferred via parent's arms.  Action: Receiving unit notified of transfer: Yes. Patient and family notified of room change. Belongings sent to receiving unit. Accompanied by Registered Nurse. Oriented patient to surroundings. Call light within reach. ID bands double-checked with receiving RN.  Response: Patient tolerated transfer and is stable.

## 2022-01-25 NOTE — PLAN OF CARE
Afebrile. VSS, except 4-5/10. LS clear on RA. Good PO intake, good appetite. Incision site is covered. Has a belly band in the room. Due to void by 1630. Taking Toradol, has declined tylenol as this time. Bonding well with infant in room. Patient is breast feeding every 2-3 hours. Plan to continue monitoring. Hourly monitoring completed, will continue to monitor.

## 2022-01-25 NOTE — PROGRESS NOTES
Transferred to 7130 on cart holding .   and Vignesh at bedside.  Pt comfortable and stable.  Total QBL= 759.  Breast fed  well while in pacu.  Per julia Vyas to remove farley in the PACU.  PACU removed.  Clear/marga urine.  Bedside report given to RICHY Meehan.  Call light is within  Reach.

## 2022-01-25 NOTE — ANESTHESIA CARE TRANSFER NOTE
Patient: Sultana Smallwood    Procedure: Procedure(s):   SECTION       Diagnosis: Previous  delivery affecting pregnancy [O34.219]  Diagnosis Additional Information: No value filed.    Anesthesia Type:   Spinal     Note:    Oropharynx: spontaneously breathing and oropharynx clear of all foreign objects  Level of Consciousness: awake  Oxygen Supplementation: room air    Independent Airway: airway patency satisfactory and stable    Vital Signs Stable: post-procedure vital signs reviewed and stable  Report to RN Given: handoff report given  Patient transferred to: PACU    Handoff Report: Identifed the Patient, Identified the Reponsible Provider, Reviewed the pertinent medical history, Discussed the surgical course, Reviewed Intra-OP anesthesia mangement and issues during anesthesia, Set expectations for post-procedure period and Allowed opportunity for questions and acknowledgement of understanding      Vitals:  Vitals Value Taken Time   /78 22 1045   Temp 36.9  C (98.5  F) 22 1045   Pulse 101 22 1045   Resp 16 22 1045   SpO2 98 % 22 1045       Electronically Signed By: Diego Worthy  2022  10:57 AM

## 2022-01-25 NOTE — DISCHARGE SUMMARY
[facility] Discharge Summary    Sultana Smallwood MRN# [id number]   Age: 31 year old YOB: 1990     Date of Admission:  2022  Date of Discharge::  22   Admitting Physician:  Charisse Pereira MD  Discharge Physician:  Aracelis Coe MD              Admission Diagnoses:   Intrauterine pregnancy at 39w1d  History of CS x1          Discharge Diagnosis:     Same, delivered           Procedures:     Procedure(s): Scheduled repeat low transverse  section with two layer closure via Pfannenstiel Pfannenstiel skin incision  Spinal anesthesia                 Medications Prior to Admission:     Medications Prior to Admission   Medication Sig Dispense Refill Last Dose     acetaminophen (TYLENOL) 325 MG tablet Take 2 tablets (650 mg) by mouth every 6 hours as needed for mild pain Start after Delivery. 100 tablet 0      cyanocobalamin (VITAMIN B-12) 1000 MCG tablet Take 1 tablet (1,000 mcg) by mouth every other day 60 tablet 1      ferrous gluconate (FERGON) 324 (38 Fe) MG tablet Take 1 tablet (324 mg) by mouth every other day 60 tablet 1      ibuprofen (ADVIL/MOTRIN) 600 MG tablet Take 1 tablet (600 mg) by mouth every 6 hours as needed for moderate pain Start after delivery 60 tablet 0      Prenatal Vit-Fe Fumarate-FA (PRENATAL MULTIVITAMIN W/IRON) 27-0.8 MG tablet Take 1 tablet by mouth daily        senna-docusate (SENOKOT-S/PERICOLACE) 8.6-50 MG tablet Take 1 tablet by mouth daily Start after delivery. 100 tablet 0      vitamin C (ASCORBIC ACID) 250 MG tablet Take 1 tablet (250 mg) by mouth every other day 60 tablet 1      [DISCONTINUED] famotidine (PEPCID) 20 MG tablet Take 1 tablet (20 mg) by mouth 2 times daily (Patient not taking: Reported on 2021) 60 tablet 1              Discharge Medications:        Review of your medicines      CONTINUE these medicines which have NOT CHANGED      Dose / Directions   acetaminophen 325 MG tablet  Commonly known as: TYLENOL  Used for:  Supervision of other normal pregnancy      Dose: 650 mg  Take 2 tablets (650 mg) by mouth every 6 hours as needed for mild pain Start after Delivery.  Quantity: 100 tablet  Refills: 0     cyanocobalamin 1000 MCG tablet  Commonly known as: VITAMIN B-12  Used for: Iron deficiency anemia, unspecified iron deficiency anemia type      Dose: 1,000 mcg  Take 1 tablet (1,000 mcg) by mouth every other day  Quantity: 60 tablet  Refills: 1     ferrous gluconate 324 (38 Fe) MG tablet  Commonly known as: FERGON  Used for: Iron deficiency anemia, unspecified iron deficiency anemia type      Dose: 324 mg  Take 1 tablet (324 mg) by mouth every other day  Quantity: 60 tablet  Refills: 1     ibuprofen 600 MG tablet  Commonly known as: ADVIL/MOTRIN  Used for: Supervision of other normal pregnancy      Dose: 600 mg  Take 1 tablet (600 mg) by mouth every 6 hours as needed for moderate pain Start after delivery  Quantity: 60 tablet  Refills: 0     prenatal multivitamin w/iron 27-0.8 MG tablet      Dose: 1 tablet  Take 1 tablet by mouth daily  Refills: 0     senna-docusate 8.6-50 MG tablet  Commonly known as: SENOKOT-S/PERICOLACE  Used for: Supervision of other normal pregnancy      Dose: 1 tablet  Take 1 tablet by mouth daily Start after delivery.  Quantity: 100 tablet  Refills: 0     vitamin C 250 MG tablet  Commonly known as: ASCORBIC ACID  Used for: Iron deficiency anemia, unspecified iron deficiency anemia type      Dose: 250 mg  Take 1 tablet (250 mg) by mouth every other day  Quantity: 60 tablet  Refills: 1        STOP taking    famotidine 20 MG tablet  Commonly known as: PEPCID                  Consultations:   Anesthesia           Brief Admission History:   Sultana Smallwood is a 31 year old  who was admitted at 39w1d by LMP c/w 12w2d ultrasound for scheduled repeat  section. She was initially planning for TOLAC but was found to be breech in clinic so repeat CS was planned. Today in triage was found to be  cephalic, but after counseling elected to proceed with repeat CS rather than TOLAC. The risks, benefits, and alternatives of  delivery were explained and the patient agreed to proceed.      Intraoperative course   The procedure was uncomplicated.   mL.  See operative report for details.   Operative findings:   - Mild recto-fascial adhesions. No intraabdominal adhesions.  No abdominal wall adhesions.   - Single, liveborn female infant at 0933 hours on 2022. No nuchal. Apgars of 9 and 9 at one and five minutes. Birth weight: 3420 g. Fetal presentation: Vertex. Amniotic fluid: Clear.     - Placenta intact with 3 vessel cord.     - Normal appearing uterus, normal fallopian tubes with several small left paratubal cysts, one removed and sent to pathology, normal appearing ovaries.   - Good uterine tone.        Postpartum Course   The patient's hospital course was unremarkable. She recovered as anticipated and experienced no post-operative complications. On discharge, her pain was well controlled. Vaginal bleeding is similar to peak menstrual flow.  Voiding without difficulty.  Ambulating well and tolerating a normal diet.  No fever or significant wound drainage.  Breastfeeding well.  Infant is stable.  No bowel movement yet.  She was discharged on post-partum day #2.    Post-partum hemoglobin:   Hemoglobin   Date Value Ref Range Status   2022 10.6 (L) 11.7 - 15.7 g/dL Final             Discharge Instructions and Follow-Up:     Discharge diet: Regular   Discharge activity: No lifting greater than 20 lbs, pushing, pulling, or other strenuous activity for 6 weeks. Pelvic rest for 6 weeks including no sexual intercourse, tampons, or douching. No driving until you can slam on the brakes without pain or while on narcotic pain medications.    Discharge follow-up: Follow up with primary OB for routine postpartum visit in 6 weeks   Wound care: Keep incision clean and dry           Discharge Disposition:      Discharged to home          Jennifer Gomes MD  OB/GYN Resident PGY-2  8:46 AM January 27, 2022

## 2022-01-25 NOTE — ANESTHESIA PROCEDURE NOTES
Intrathecal injection Procedure Note    Pre-Procedure   Staff -        Anesthesiologist:  Rafy Baron DO       Resident/Fellow: Diego Worthy       Other Anesthesia Staff: Ad Keating MD       Performed By: resident       Location: OB       Procedure Start/Stop Times: 1/25/2022 8:55 AM and 1/25/2022 9:05 AM       Pre-Anesthestic Checklist: patient identified, IV checked, risks and benefits discussed, informed consent, monitors and equipment checked, pre-op evaluation, at physician/surgeon's request and post-op pain management  Timeout:       Correct Patient: Yes        Correct Procedure: Yes        Correct Site: Yes        Correct Position: Yes   Procedure Documentation  Procedure: intrathecal injection       Patient Position: sitting       Skin prep: Chloraprep       Insertion Site: L3-4. (midline approach).       Needle Gauge: 20.        Needle Length (Inches): 3.5        Spinal Needle Type: Pencan       Introducer used       # of attempts: 1 and  # of redirects:  2    Assessment/Narrative         Paresthesias: No.       CSF fluid: clear.      Opening pressure was cmH2O while  Sitting.      Medication(s) Administered   0.75% Hyperbaric Bupivacaine (Intrathecal), 1.8 mL  Morphine PF 1 mg/mL (Intrathecal), 0.15 mg  Fentanyl PF (Intrathecal), 15 mcg  Medication Administration Time: 1/25/2022 9:05 AM

## 2022-01-25 NOTE — ANESTHESIA PREPROCEDURE EVALUATION
Anesthesia Pre-Procedure Evaluation    Patient: Sultana Smallwood   MRN: [id number] : 1990        Preoperative Diagnosis: Previous  delivery affecting pregnancy [O34.219]    Procedure : Procedure(s):   SECTION          No past medical history on file.   Past Surgical History:   Procedure Laterality Date      SECTION        No Known Allergies   Social History     Tobacco Use     Smoking status: Never Smoker     Smokeless tobacco: Never Used   Substance Use Topics     Alcohol use: Not Currently      Wt Readings from Last 1 Encounters:   22 94.8 kg (208 lb 14.4 oz)        Anesthesia Evaluation   Pt has had prior anesthetic. Type: Regional.    No history of anesthetic complications       ROS/MED HX  ENT/Pulmonary:  - neg pulmonary ROS  (-) tobacco use   Neurologic:  - neg neurologic ROS     Cardiovascular:  - neg cardiovascular ROS     METS/Exercise Tolerance: >4 METS    Hematologic:  - neg hematologic  ROS     Musculoskeletal:       GI/Hepatic:  - neg GI/hepatic ROS     Renal/Genitourinary:       Endo:     (+) Obesity,     Psychiatric/Substance Use:       Infectious Disease:       Malignancy:       Other:      (+) Possibly pregnant, , previous  (x1),         Physical Exam    Airway  airway exam normal      Mallampati: II   TM distance: > 3 FB   Neck ROM: full   Mouth opening: > 3 cm    Respiratory Devices and Support         Dental  no notable dental history         Cardiovascular   cardiovascular exam normal       Rhythm and rate: regular and normal     Pulmonary   pulmonary exam normal        breath sounds clear to auscultation           OUTSIDE LABS:  CBC:   Lab Results   Component Value Date    WBC 9.3 2022    WBC 9.9 2021    HGB 11.8 2022    HGB 11.1 (L) 2022    HCT 37.1 2022    HCT 34.1 (L) 2021     2022     2021     BMP:   Lab Results   Component Value Date     2021    POTASSIUM 3.5  2021    CHLORIDE 107 2021    CO2 25 2021    BUN 8 2021    CR 0.55 2021     (H) 2021     COAGS: No results found for: PTT, INR, FIBR  POC: No results found for: BGM, HCG, HCGS  HEPATIC:   Lab Results   Component Value Date    ALBUMIN 2.6 (L) 2021    PROTTOTAL 6.8 2021    ALT 25 2021    AST 17 2021    ALKPHOS 104 2021    BILITOTAL 0.3 2021     OTHER:   Lab Results   Component Value Date    A1C 5.2 2021    PERCY 8.5 2021       Anesthesia Plan    ASA Status:  2      Anesthesia Type: Spinal.         Techniques and Equipment:       - Blood: T&S     - Drips/Meds: Phenylephrine (pitocin)     Consents    Anesthesia Plan(s) and associated risks, benefits, and realistic alternatives discussed. Questions answered and patient/representative(s) expressed understanding.    - Discussed:     - Discussed with:  Patient,     Use of blood products discussed: Yes.     - Discussed with: Patient.     - Consented: consented to blood products            Reason for refusal: other.     Postoperative Care    Pain management: Multi-modal analgesia, Peripheral nerve block (Single Shot), intrathecal morphine.   PONV prophylaxis: Ondansetron (or other 5HT-3)     Comments:    Other Comments: Sultana is a 31-year-old  at 39w1d presenting for repeat C/S for breach position. COVID recovered (positive test 21).        neg OB ROS.       Ad Keating MD

## 2022-01-26 LAB — HGB BLD-MCNC: 10.6 G/DL (ref 11.7–15.7)

## 2022-01-26 PROCEDURE — 250N000013 HC RX MED GY IP 250 OP 250 PS 637: Performed by: STUDENT IN AN ORGANIZED HEALTH CARE EDUCATION/TRAINING PROGRAM

## 2022-01-26 PROCEDURE — 85018 HEMOGLOBIN: CPT | Performed by: STUDENT IN AN ORGANIZED HEALTH CARE EDUCATION/TRAINING PROGRAM

## 2022-01-26 PROCEDURE — 36415 COLL VENOUS BLD VENIPUNCTURE: CPT | Performed by: STUDENT IN AN ORGANIZED HEALTH CARE EDUCATION/TRAINING PROGRAM

## 2022-01-26 PROCEDURE — 120N000002 HC R&B MED SURG/OB UMMC

## 2022-01-26 PROCEDURE — 250N000011 HC RX IP 250 OP 636: Performed by: STUDENT IN AN ORGANIZED HEALTH CARE EDUCATION/TRAINING PROGRAM

## 2022-01-26 RX ADMIN — IBUPROFEN 800 MG: 800 TABLET, FILM COATED ORAL at 18:30

## 2022-01-26 RX ADMIN — POLYETHYLENE GLYCOL 3350 17 G: 17 POWDER, FOR SOLUTION ORAL at 09:13

## 2022-01-26 RX ADMIN — SIMETHICONE 80 MG: 80 TABLET, CHEWABLE ORAL at 09:12

## 2022-01-26 RX ADMIN — LIDOCAINE PATCH 4% 1 PATCH: 40 PATCH TOPICAL at 09:12

## 2022-01-26 RX ADMIN — ACETAMINOPHEN 975 MG: 325 TABLET, FILM COATED ORAL at 12:24

## 2022-01-26 RX ADMIN — IBUPROFEN 800 MG: 800 TABLET, FILM COATED ORAL at 12:25

## 2022-01-26 RX ADMIN — DOCUSATE SODIUM AND SENNOSIDES 2 TABLET: 8.6; 5 TABLET ORAL at 21:37

## 2022-01-26 RX ADMIN — ENOXAPARIN SODIUM 40 MG: 40 INJECTION SUBCUTANEOUS at 09:12

## 2022-01-26 RX ADMIN — ACETAMINOPHEN 975 MG: 325 TABLET, FILM COATED ORAL at 18:30

## 2022-01-26 RX ADMIN — DOCUSATE SODIUM AND SENNOSIDES 2 TABLET: 8.6; 5 TABLET ORAL at 09:12

## 2022-01-26 RX ADMIN — ACETAMINOPHEN 975 MG: 325 TABLET, FILM COATED ORAL at 05:16

## 2022-01-26 RX ADMIN — KETOROLAC TROMETHAMINE 30 MG: 30 INJECTION, SOLUTION INTRAMUSCULAR; INTRAVENOUS at 05:16

## 2022-01-26 NOTE — PLAN OF CARE
Pt has voided twice since removal of farley. Taking Toradol and Tylenol for pain control. Able to ambulate to the bathroom and within the room with stand by assist. Breastfeeding with minimal assistance. Lactation consult order released. Continue with routine postpartum cares and assist with breastfeeding as needed.

## 2022-01-26 NOTE — PROGRESS NOTES
Brief Anesthesia Progress Note:    Patient is doing well after  and spinal anesthesia. No residual leg weakness, numbness, tingling. Spinal site without redness, induration, pain on palpation.     Pain control adequate with TAP block + PO medications from primary team.     Discussed red flag symptoms that she should watch out for over the next several days that she should go to the ED for. All questions answered.     Diego Worthy on 2022 at 2:21 PM

## 2022-01-26 NOTE — PROGRESS NOTES
"[facility]    Obstetrics Post-Op / Progress Note    Assessment & Plan   Assessment:  -1 Day Post-Op  Procedure(s):   SECTION    Doing well.  Clean wound without signs of infection.  No immediate surgical complications identified.  Pain well-controlled.    Plan:  Ambulation encouraged  Breast feeding strategies discussed    Lorene Murray     Interval History   Doing well.  Pain is well-controlled.  No fevers.  No history of wound drainage, warmth or significant erythema.  Good appetite.  Denies chest pain, shortness of breath, nausea or vomiting.  Ambulatory.  Breastfeeding well.    Medications     bupivacaine liposome (EXPAREL) LA inj was given in the infiltration site to produce post-op analgesia. Duration of action is up to 72 hours. Other \"iram\" meds should not be given for 96 hours except for lidocaine 4% patch. This is for INFORMATION ONLY.       bupivacaine liposome (EXPAREL) LA inj was given in the infiltration site to produce post-op analgesia. Duration of action is up to 72 hours. Other \"iram\" meds should not be given for 96 hours except for lidocaine 4% patch. This is for INFORMATION ONLY.       dextrose 5% lactated ringers       - MEDICATION INSTRUCTIONS -       - MEDICATION INSTRUCTIONS -       [START ON 2022] oxytocin in 0.9% NaCl       oxytocin in 0.9% NaCl         acetaminophen  975 mg Oral Q6H     enoxaparin ANTICOAGULANT  40 mg Subcutaneous Q24H     ibuprofen  800 mg Oral Q6H     lidocaine  1 patch Transdermal Q24H     lidocaine   Transdermal Q8H     polyethylene glycol  17 g Oral Daily     senna-docusate  1 tablet Oral BID    Or     senna-docusate  2 tablet Oral BID     sodium chloride (PF)  3 mL Intracatheter Q8H       Physical Exam   Temp: 98.5  F (36.9  C) Temp src: Oral BP: 103/62 Pulse: 84   Resp: 18 SpO2: 97 %      Vitals:    22 0701   Weight: 90.7 kg (200 lb)     Vital Signs with Ranges  Temp:  [98  F (36.7  C)-98.6  F " (37  C)] 98.5  F (36.9  C)  Pulse:  [] 84  Resp:  [16-20] 18  BP: ()/(60-78) 103/62  SpO2:  [96 %-100 %] 97 %  I/O last 3 completed shifts:  In: 742 [P.O.:60; I.V.:682]  Out: 1984 [Urine:1225; Blood:759]    Uterine fundus is firm, non-tender and at the level of the umbilicus  Incision C/D/I    Data   Recent Labs   Lab Test 01/24/22  0834   AS Negative     Recent Labs   Lab Test 01/26/22  0741 01/24/22  0834   HGB 10.6* 11.8     Recent Labs   Lab Test 08/09/21  1603   RUQIGG Positive*

## 2022-01-26 NOTE — PROGRESS NOTES
"[facility]   Post-partum Progress Note    Name:  Sultana Smallwood  MRN: [id number]    S: History taken with the assistance of a Marshallese phone . Patient reports feeling well this morning, feels ready to go home.  Pain well controlled.  Tolerating regular diet without nausea or vomiting.  Ambulating without dizziness.  Voiding spontaneously. Has passed flatus; has had bowel movement. Lochia similar to menstrual flow. Denies fever/chills, headache, vision changes, chest pain, shortness of breath, RUQ pain, increased edema. Is breast feeding without concern. Desires a Nexplanon for contraception - if possible to place prior to discharge, or at 6w PP.    O:   Patient Vitals for the past 24 hrs:   BP Temp Temp src Pulse Resp SpO2 Height Weight   01/25/22 2100 103/64 98.6  F (37  C) Oral 85 20 99 % -- --   01/25/22 1700 103/61 98.3  F (36.8  C) Oral 98 18 99 % -- --   01/25/22 1600 101/60 98.5  F (36.9  C) Oral 105 20 100 % -- --   01/25/22 1500 109/65 98.4  F (36.9  C) Oral 100 16 98 % -- --   01/25/22 1400 104/60 98.1  F (36.7  C) Oral 95 16 97 % -- --   01/25/22 1330 107/64 98  F (36.7  C) Oral 97 16 99 % -- --   01/25/22 1300 114/67 98  F (36.7  C) Oral 93 16 99 % -- --   01/25/22 1232 109/63 98.5  F (36.9  C) Oral 85 16 98 % -- --   01/25/22 1215 111/69 -- -- 84 18 98 % -- --   01/25/22 1200 102/66 -- -- 86 18 99 % -- --   01/25/22 1145 116/75 -- -- 85 18 99 % -- --   01/25/22 1130 114/69 -- -- 84 18 98 % -- --   01/25/22 1115 105/66 -- -- 87 18 96 % -- --   01/25/22 1100 99/71 -- -- 96 18 97 % -- --   01/25/22 1045 114/78 98.5  F (36.9  C) Oral 101 16 98 % -- --   01/25/22 0701 110/69 98.3  F (36.8  C) Oral 92 16 -- 1.6 m (5' 2.99\") 90.7 kg (200 lb)     Gen:  Resting comfortably, NAD  CV:  RRR  Pulm:  Non-labored breathing. No cough or audible wheezing.   Abd:  Soft, appropriately tender to palpation, non-distended.  Fundus firm below umbilicus, firm, and non-tender.  Inc:  C/D/I "   Ext:  Non-tender, trace + LE edema b/l    Hgb:   Recent Labs   Lab 22  0834   HGB 11.8     A/P:  31 year old  POD#2 s/p RLTCS. Pregnancy notable for COVID infection in third trimester. Meeting postop goals. Continue with routine postpartum management.     #Routine Postpartum  Pain: Well-controlled with scheduled tylenol, toradol > ibuprofen, PRN oxy. Lido patch. S/p TAP block  Hgb: 11.8 > QBL 750cc > 10.6. VSS as noted above, asymptomatic. Will discharge with PO Fe if Hgb < 10  GI:  BID Senna/Colace.  PRN Simethicone.   : Voiding spontaneously  PPx:  Encouraged ambulation, Lovenox  Rh: Positive  Rub:  Immune  Hep B: Surface antigen negative  Baby: In room, doing well  Feed: Breast feeding  BC: Desires Nexplanon.   Dispo: Plan for home on POD#2-3.    Lauren Higgins MD   OB/GYN PGY-2  22 8:39 AM

## 2022-01-26 NOTE — PLAN OF CARE
Data: Vital signs within normal limits. Postpartum checks within normal limits - see flow record. Patient eating and drinking normally. Patient able to empty bladder independently and is up ambulating but needs encouragement to do so. No apparent signs of infection. Incision covered, dressing clean, dry, and intact. Patient performing self cares and is able to care for infant. Breastfeeding on cue.  used.  Action: Patient medicated during the shift for pain. See MAR. Patient reassessed within 1 hour after each medication and pain was improved - patient stated she was comfortable. Patient education done. See flow record.  Response: Positive attachment behaviors observed with infant. Support persons Vignesh present.   Plan: Continue with plan of care.

## 2022-01-26 NOTE — PLAN OF CARE
VSS. Pain managed with ibuprofen and tylenol. Declines need for oxycodone. Lidocaine patch in place. Positive bonding observed with .

## 2022-01-27 ENCOUNTER — APPOINTMENT (OUTPATIENT)
Dept: INTERPRETER SERVICES | Facility: CLINIC | Age: 32
End: 2022-01-27
Payer: COMMERCIAL

## 2022-01-27 VITALS
WEIGHT: 200 LBS | SYSTOLIC BLOOD PRESSURE: 114 MMHG | DIASTOLIC BLOOD PRESSURE: 81 MMHG | RESPIRATION RATE: 16 BRPM | HEART RATE: 85 BPM | BODY MASS INDEX: 35.44 KG/M2 | OXYGEN SATURATION: 98 % | HEIGHT: 63 IN | TEMPERATURE: 98 F

## 2022-01-27 PROCEDURE — 250N000013 HC RX MED GY IP 250 OP 250 PS 637: Performed by: STUDENT IN AN ORGANIZED HEALTH CARE EDUCATION/TRAINING PROGRAM

## 2022-01-27 PROCEDURE — 250N000011 HC RX IP 250 OP 636: Performed by: STUDENT IN AN ORGANIZED HEALTH CARE EDUCATION/TRAINING PROGRAM

## 2022-01-27 RX ADMIN — IBUPROFEN 800 MG: 800 TABLET, FILM COATED ORAL at 06:31

## 2022-01-27 RX ADMIN — IBUPROFEN 800 MG: 800 TABLET, FILM COATED ORAL at 00:46

## 2022-01-27 RX ADMIN — ENOXAPARIN SODIUM 40 MG: 40 INJECTION SUBCUTANEOUS at 07:51

## 2022-01-27 RX ADMIN — ACETAMINOPHEN 975 MG: 325 TABLET, FILM COATED ORAL at 06:31

## 2022-01-27 RX ADMIN — ACETAMINOPHEN 975 MG: 325 TABLET, FILM COATED ORAL at 13:26

## 2022-01-27 RX ADMIN — IBUPROFEN 800 MG: 800 TABLET, FILM COATED ORAL at 13:26

## 2022-01-27 RX ADMIN — DOCUSATE SODIUM AND SENNOSIDES 2 TABLET: 8.6; 5 TABLET ORAL at 07:50

## 2022-01-27 RX ADMIN — SIMETHICONE 80 MG: 80 TABLET, CHEWABLE ORAL at 07:50

## 2022-01-27 RX ADMIN — ACETAMINOPHEN 975 MG: 325 TABLET, FILM COATED ORAL at 00:46

## 2022-01-27 RX ADMIN — POLYETHYLENE GLYCOL 3350 17 G: 17 POWDER, FOR SOLUTION ORAL at 07:50

## 2022-01-27 NOTE — PLAN OF CARE
[id number]: Vitals stable overnight. Incision dressing CDI. Patient declined showering last night but plans to do so this morning. Reports moderate incisional pain. Taking tylenol and ibuprofen. Declines oxycodone. Using lidocaine patch. Bonding well with baby. Reports breastfeeding is going well but does not call out for help.  at bedside and supportive.

## 2022-01-27 NOTE — DISCHARGE INSTRUCTIONS
Call your health care provider if you have any of the following:   - Fever above 100.4 F  - Opening or drainage from your incision  - Vaginal bleeding that soaks 1 sanitary pad per hour for two hours, or if you see blood clots larger than a golf ball  - Malodorous vaginal discharge  - Severe or worsening pain uncontrolled by your pain medications  - Nausea and vomiting  - Severe headaches  - Changes in vision  - Chest pain  - Shortness of breath  - Calf swelling or pain  - Problems coping with sadness, anxiety, or depression. If you have any concerns about hurting yourself or the baby, call your provider immediately.   - You are encouraged to call with questions or concerns after you return home.    Activity Instructions:   -  delivery: No lifting more than 15 pounds for 6 weeks. No pushing, pulling, or other strenuous activity for 6 weeks. Nothing in the vagina for 6 weeks, no intercourse for 6 weeks. No driving until you can slam on the breaks without pain or while taking narcotic pain medications (e.g. Oxycodone).    Schedule follow-up postpartum visits with your OB provider in:  - 6 weeks for a routine postpartum visit    Follow-up with your primary care provider as scheduled/needed.    Postop  Birth Instructions    Activity       Do not lift more than 10 pounds for 6 weeks after surgery.  Ask family and friends for help when you need it.    No driving until you have stopped taking your pain medications (usually two weeks after surgery).    No heavy exercise or activity for 6 weeks.  Don't do anything that will put a strain on your surgery site.    Don't strain when using the toilet.  Your care team may prescribe a stool softener if you have problems with your bowel movements.     To care for your incision:       Keep the incision clean and dry.    Do not soak your incision in water. No swimming or hot tubs until it has fully healed. You may soak in the bathtub if the water level is below your  incision.    Do not use peroxide, gel, cream, lotion, or ointment on your incision.    Adjust your clothes to avoid pressure on your surgery site (check the elastic in your underwear for example).     You may see a small amount of clear or pink drainage and this is normal.  Check with your health care provider:       If the drainage increases or has an odor.    If the incision reddens, you have swelling, or develop a rash.    If you have increased pain and the medicine we prescribed doesn't help.    If you have a fever above 100.4 F (38 C) with or without chills when placing thermometer under your tongue.   The area around your incision (surgery wound), will feel numb.  This is normal. The numbness should go away in less than a year.     Keep your hands clean:  Always wash your hands before touching your incision (surgery wound). This helps reduce your risk of infection. If your hands aren't dirty, you may use an alcohol hand-rub to clean your hands. Keep your nails clean and short.    Call your healthcare provider if you have any of these symptoms:       You soak a sanitary pad with blood within 1 hour, or you see blood clots larger than a golf ball.    Bleeding that lasts more than 6 weeks.    Vaginal discharge that smells bad.    Severe pain, cramping or tenderness in your lower belly area.    A need to urinate more frequently (use the toilet more often), more urgently (use the toilet very quickly), or it burns when you urinate.    Nausea and vomiting.    Redness, swelling or pain around a vein in your leg.    Problems breastfeeding or a red or painful area on your breast.    Chest pain and cough or are gasping for air.    Problems with coping with sadness, anxiety or depression. If you have concerns about hurting yourself or the baby, call your provider immediately.      You have questions or concerns after you return home.

## 2022-01-27 NOTE — PLAN OF CARE
VSS, pt up ambulating independently within room. Taking IBU and Tylenol for pain control. Pt is breast feeding independently, latch observed/checked this shift. Medicated per orders, encouraged fluids and ambulation. Continue with postpartum cares and medicate as needed.

## 2022-01-28 ENCOUNTER — APPOINTMENT (OUTPATIENT)
Dept: INTERPRETER SERVICES | Facility: CLINIC | Age: 32
End: 2022-01-28
Payer: COMMERCIAL

## 2022-01-28 ENCOUNTER — TELEPHONE (OUTPATIENT)
Dept: OBGYN | Facility: CLINIC | Age: 32
End: 2022-01-28
Payer: COMMERCIAL

## 2022-01-28 DIAGNOSIS — Z71.89 OTHER SPECIFIED COUNSELING: ICD-10-CM

## 2022-01-28 NOTE — TELEPHONE ENCOUNTER
"TC to clinic via  to report swollen feet and sore breasts. Counseled that swollen feet are normal three days post C/S, and to alternate getting some light activity/walking in throughout the day, but to balance this with elevating feet while sitting/resting/feeding baby. Denies leg pain, HA, vision changes and nausea. Describes breasts as like \"stones\". States that baby is feeding every 1.5-2.5 hours. She has tried using an electric pump and hand expression to relieve some pressure, but does not empty milk. States that baby is getting milk, nurses on both sides, and sleeps between feeds. She feels that that her breasts feel less full after baby feeds. Advised to try using cold compresses after feeds, and warm before. Can try warm shower and massage/hand expression to soften breasts to help baby achieve efficient latch. Continue taking Ibuprofen (dosage reviewed) for C/S pain as it may also help with breast pain and inflammation. Reviewed supply and demand and to continue to feed baby on demand to empty breasts. Scheduled appointment with lactation on 2/2. Routed to lactation provider for awareness.   Malgorzata Barron RN     "

## 2022-02-03 ENCOUNTER — LACTATION ENCOUNTER (OUTPATIENT)
Age: 32
End: 2022-02-03
Payer: COMMERCIAL

## 2022-02-03 NOTE — LACTATION NOTE
This note was copied from a baby's chart.  Consult for pateint request, mom is pumping and supplementing with formula. Met with mom, Sultana while she was feeding Venus, standing up in room on the phone breastfeeding well while standing. Using phone , mother notes all is going well and this is not her first time breastfeeding, says she had no difficulty and excellent milk supply with her first child. Her only concern was she feels like Venus is not getting deep enough latch, this is why she wants to give supplements.     We discussed anatomy of breast and infant mouth for feedings, importance of deep latch for comfort and milk transfer, how to tell if deep latch (look, feel) and transferring milk well. Point out Venus latched just on nipple and with permission, demo latching again with nipple up by nose, using lower areola on lower lip aiming high for deeper latch. Sultana noted difference and reports more comfortable, able to see deep and relaxed jaw pulls. She denies any other questions, says she feels she can continue to practice this at home. They plan follow up at [facility], encouraged to see lactation nurses there also if any concerns about breastfeeding going forward. Offer support and encouragement, answered questions.

## 2022-02-06 LAB
PATH REPORT.COMMENTS IMP SPEC: NORMAL
PATH REPORT.COMMENTS IMP SPEC: NORMAL
PATH REPORT.FINAL DX SPEC: NORMAL
PATH REPORT.GROSS SPEC: NORMAL
PATH REPORT.MICROSCOPIC SPEC OTHER STN: NORMAL
PATH REPORT.RELEVANT HX SPEC: NORMAL
PHOTO IMAGE: NORMAL

## 2022-02-07 ENCOUNTER — TELEPHONE (OUTPATIENT)
Dept: OBGYN | Facility: CLINIC | Age: 32
End: 2022-02-07
Payer: COMMERCIAL

## 2022-02-07 ENCOUNTER — APPOINTMENT (OUTPATIENT)
Dept: INTERPRETER SERVICES | Facility: CLINIC | Age: 32
End: 2022-02-07
Payer: COMMERCIAL

## 2022-02-07 DIAGNOSIS — N61.0 MASTITIS: Primary | ICD-10-CM

## 2022-02-07 RX ORDER — DICLOXACILLIN SODIUM 500 MG
500 CAPSULE ORAL 4 TIMES DAILY
Qty: 40 CAPSULE | Refills: 0 | Status: SHIPPED | OUTPATIENT
Start: 2022-02-07 | End: 2022-02-17

## 2022-02-07 NOTE — TELEPHONE ENCOUNTER
Patient notified of prescription via . Patient will contact clinic back if no improvement of symptoms within the next 48 hours. Marisela Kathleen RN

## 2022-02-07 NOTE — TELEPHONE ENCOUNTER
Spoke with patient and . Delivered 1/25 and is breastfeeding, occasionally giving a bottle. Patient complains of left breast pain that started on Saturday. Breast feels hard/engorged. She felt hot like she had a fever yesterday, but did not have a thermometer to check. Feeling sick. Redness noted to breast. She went to peds appointment with baby today, and states they confirmed mastitis and told her to call the OB. Patient given instructions on ways to move milk-hand expression in warm shower or tub, pumping if baby not latching well on that side, alternating feeding positions. Do you want to treat with abx? Pharmacy queued. Marisela Kathleen RN

## 2022-02-10 ENCOUNTER — OFFICE VISIT (OUTPATIENT)
Dept: OBGYN | Facility: CLINIC | Age: 32
End: 2022-02-10
Payer: COMMERCIAL

## 2022-02-10 VITALS
WEIGHT: 185.3 LBS | OXYGEN SATURATION: 96 % | BODY MASS INDEX: 32.83 KG/M2 | DIASTOLIC BLOOD PRESSURE: 64 MMHG | SYSTOLIC BLOOD PRESSURE: 110 MMHG | TEMPERATURE: 97.7 F | HEART RATE: 77 BPM

## 2022-02-10 DIAGNOSIS — Z30.017 ENCOUNTER FOR INSERTION OF SUBDERMAL CONTRACEPTIVE: Primary | ICD-10-CM

## 2022-02-10 LAB — HCG UR QL: NEGATIVE

## 2022-02-10 PROCEDURE — 81025 URINE PREGNANCY TEST: CPT | Performed by: OBSTETRICS & GYNECOLOGY

## 2022-02-10 PROCEDURE — 11981 INSERTION DRUG DLVR IMPLANT: CPT | Performed by: OBSTETRICS & GYNECOLOGY

## 2022-02-10 NOTE — PROGRESS NOTES
"Ob Progress Note     CC: Nexplanon placement     HPI: Sultana Smallwood is a 32 YO  who presents 3 weeks s/p repeat LTCS for Nexplanon placement. She reports that she has been doing well since her procedure and denies any concerns. Still having pain at the incision R>L but overall recovering well.     O:   Vital signs:  Temp: 97.7  F (36.5  C)   BP: 110/64 Pulse: 77     SpO2: 96 %       Weight: 84.1 kg (185 lb 4.8 oz)  Estimated body mass index is 32.83 kg/m  as calculated from the following:    Height as of 22: 1.6 m (5' 2.99\").    Weight as of this encounter: 84.1 kg (185 lb 4.8 oz).    General: Patient alert and oriented, no acute distress  CV: no peripheral edema or cyanosis  Resp: normal respiratory effort and equal lung expansion  Abdomen: non-tender to light and deep palpation, no masses, organomegaly or hernia  Incision: healing well, some edema on the right side of the incision but no signs of infection or wound dehiscence   Ext: non-tender, no edema    Sultana Smallwood desired a nexplanon insertion. We discussed the risks/benefits of the procedure, including small scar on the arm, mild pain/bruising after the procedure, irregular bleeding/spotting, and that the device is effective for 3 years and must be removed after that time. Consent was signed and a time out performed prior to insertion.    We cleaned the skin of the left upper arm 3 finger widths from the olecranon process with betadine. We injected 3 mL of 1% lidocaine into the arm and then inserted the nexplanon device subdermally.  Both the examiner and the patient felt the alireza after placement. The site was dressed with a sterile dressing. The patient tolerated the procedure well. EBL minimal.    Assessment and Plan   Sultana Smallwood is a 32 YO  who presents for Nexplanon placement   -Nexplanon placed without difficulty   -Reviewed warning signs of infection and when to call     Dispo: RTC in 3-4 weeks for post-partum " visit or sooner EDER Pereira MD

## 2022-03-09 ENCOUNTER — APPOINTMENT (OUTPATIENT)
Dept: INTERPRETER SERVICES | Facility: CLINIC | Age: 32
End: 2022-03-09
Payer: COMMERCIAL

## 2022-03-14 ENCOUNTER — PRENATAL OFFICE VISIT (OUTPATIENT)
Dept: OBGYN | Facility: CLINIC | Age: 32
End: 2022-03-14
Payer: COMMERCIAL

## 2022-03-14 VITALS
DIASTOLIC BLOOD PRESSURE: 73 MMHG | OXYGEN SATURATION: 97 % | TEMPERATURE: 98.2 F | BODY MASS INDEX: 31.88 KG/M2 | SYSTOLIC BLOOD PRESSURE: 115 MMHG | HEART RATE: 77 BPM | WEIGHT: 179.9 LBS

## 2022-03-14 PROCEDURE — 99207 PR POST PARTUM EXAM: CPT | Performed by: OBSTETRICS & GYNECOLOGY

## 2022-03-14 RX ORDER — SWAB
1 SWAB, NON-MEDICATED MISCELLANEOUS DAILY
Qty: 90 TABLET | Refills: 3 | Status: SHIPPED | OUTPATIENT
Start: 2022-03-14 | End: 2023-03-14

## 2022-03-14 RX ORDER — AMOXICILLIN 250 MG
1 CAPSULE ORAL 2 TIMES DAILY PRN
Qty: 60 TABLET | Refills: 1 | Status: SHIPPED | OUTPATIENT
Start: 2022-03-14 | End: 2022-05-13

## 2022-03-14 ASSESSMENT — PATIENT HEALTH QUESTIONNAIRE - PHQ9: SUM OF ALL RESPONSES TO PHQ QUESTIONS 1-9: 0

## 2022-03-14 NOTE — PROGRESS NOTES
Postpartum office visit         Sultana Smallwood is a 31 year old  who presents for a post partum visit. She had an uncomplicated repeat LTCS.  Her pregnancy was uncomplicated and she was discharged home after an uncomplicated hospital course.         S: since delivery she reports that her lochia is minimal. She denies fever/chills, is voiding and tolerating PO without difficulty. She denies pain. She is breast feeding.She is having significant constipation with bowel movements every 3 days, she reports straining a lot and having pain and some bright red blood with bowel movements. She is not taking anything for a bowel regimen. She has resumed intercourse.  She has been coping well with bringing home a new baby and had a negative depression screen. She was told she had an endocervical polyp in the past that should be removed after her baby is born.          O:    Vitals:    22 1359   BP: 115/73   Pulse: 77   Temp: 98.2  F (36.8  C)   SpO2: 97%   Weight: 81.6 kg (179 lb 14.4 oz)       Gen: NAD  Abdomen: soft, non distended, non tender  Pelvic: normal female external genitalia. Normal vaginal mucosa and cervix, no polyp noted. Uterus normal size and contour on bimanual exam. No adnexal masses.  Incision: well healed, tender to palpation along right boarder of incision but no signs of infection/dehisence   Rectal: normal, no external hemorrhoids no rectal masses  Ext: non-tender, no edema         Depression Screen:  PATIENT HEALTH QUESTIONNAIRE-9 (PHQ - 9)    Over the last 2 weeks, how often have you been bothered by any of the following problems?    1. Little interest or pleasure in doing things -  Not at all   2. Feeling down, depressed, or hopeless -  Not at all   3. Trouble falling or staying asleep, or sleeping too much - Not at all   4. Feeling tired or having little energy -  Not at all   5. Poor appetite or overeating -  Not at all   6. Feeling bad about yourself - or that you are a failure or  have let yourself or your family down -  Not at all   7. Trouble concentrating on things, such as reading the newspaper or watching television - Not at all   8. Moving or speaking so slowly that other people could have noticed? Or the opposite - being so fidgety or restless that you have been moving around a lot more than usual Not at all   9. Thoughts that you would be better off dead or of hurting  yourself in some way Not at all   Total Score: 0     If you checked off any problems, how difficult have these problems made it for you to do your work, take care of things at home, or get along with other people? Not difficult at all    Developed by Cecil Pickens, Charissa Castorena, Manuelito Dailey and colleagues, with an educational rigoberto from [employer] No permission required to reproduce, translate, display or distribute. permission required to reproduce, translate, display or distribute.       A/P:    Sultana Smallwood is a 31 year old  who presents for post-partum visit  -Patient doing well post-partum, no concerns today  -No signs or symptoms of post-partum depression, discussed warning signs and when to call  -Nexplanon placed at prior visit for contraception   -Pap smear up to date   -Rx for stool softener sent to pharmacy, recommended that patient notify us if the bleeding with bowel movements doesn't stop once constipation resolves.     Dispo: RTC in one year or sooner EDER Pereira MD   22

## 2022-04-08 ENCOUNTER — MEDICAL CORRESPONDENCE (OUTPATIENT)
Dept: HEALTH INFORMATION MANAGEMENT | Facility: CLINIC | Age: 32
End: 2022-04-08
Payer: COMMERCIAL

## 2022-07-18 ENCOUNTER — OFFICE VISIT (OUTPATIENT)
Dept: OBGYN | Facility: CLINIC | Age: 32
End: 2022-07-18

## 2022-07-18 VITALS
DIASTOLIC BLOOD PRESSURE: 64 MMHG | BODY MASS INDEX: 31.63 KG/M2 | SYSTOLIC BLOOD PRESSURE: 108 MMHG | HEART RATE: 78 BPM | HEIGHT: 63 IN | WEIGHT: 178.5 LBS | OXYGEN SATURATION: 98 %

## 2022-07-18 DIAGNOSIS — L76.82 PAIN AT SURGICAL INCISION: Primary | ICD-10-CM

## 2022-07-18 PROCEDURE — 99213 OFFICE O/P EST LOW 20 MIN: CPT | Performed by: OBSTETRICS & GYNECOLOGY

## 2022-07-18 NOTE — PROGRESS NOTES
"GYN Progress Note     CC: incisional pain     HPI: Sultana Smallwood is a 31 YO  who presents with continued incisional pain 6 months s/p repeat  section. At her post-partum visit she was having increased pain at the right side of her incision and noted some increased swelling in that area but no other post-op concerns. She reports feeling like the incision is fully healed and pain has resolved except for at the right corner of the incision. She has pain with palpation of that area and will also have pain with lifting or bearing down. No issues with bowel or nausea/vomiting. The pain seems to be about the same since her post-op visit.     O: Vital signs:      BP: 108/64 Pulse: 78     SpO2: 98 %     Height: 160 cm (5' 2.99\") Weight: 81 kg (178 lb 8 oz)  Estimated body mass index is 31.63 kg/m  as calculated from the following:    Height as of this encounter: 1.6 m (5' 2.99\").    Weight as of this encounter: 81 kg (178 lb 8 oz).    General: Patient alert and oriented, no acute distress  CV: no peripheral edema or cyanosis  Resp: normal respiratory effort and equal lung expansion  Abdomen: non-tender to light and deep palpation except at right border of the incision. Incision well healed and no hernia palpated but some excessive subcutaneous scar tissue palpated along right side.   Ext: non-tender, no edema    Assessment and plan:   Sultana Smallwood is a 31 YO  who presents for continued incisional pain from  section   (L76.82) Pain at surgical incision  (primary encounter diagnosis)  -We discussed possible etiologies for her pain including incisional hernia, myofacial abdominal pain, nerve entrapment/irritation or abdominal wall endometriosis   Plan: abdominal ultrasound ordered to further evaluate for incisional hernia, if negative will consider PT for myofacial pain vs pain management referral to consider trigger point injections.     Dispo: pending results of US Charisse WANG" MD Randall

## (undated) DEVICE — SU MONOCRYL 0 CT-1 36" Y346H

## (undated) DEVICE — BARRIER INTERCEED 3X4" 4350

## (undated) DEVICE — SU MONOCRYL 4-0 PS-2 18" UND Y496G

## (undated) DEVICE — CATH TRAY FOLEY 16FR BARDEX W/DRAIN BAG STATLOCK 300316A

## (undated) DEVICE — ESU GROUND PAD UNIVERSAL W/O CORD

## (undated) DEVICE — GLOVE PROTEXIS BLUE W/NEU-THERA 7.0  2D73EB70

## (undated) DEVICE — SOL NACL 0.9% IRRIG 1000ML BOTTLE 07138-09

## (undated) DEVICE — SU PLAIN 2-0 CT 27" 853H

## (undated) DEVICE — SOL WATER IRRIG 1000ML BOTTLE 07139-09

## (undated) DEVICE — PREP CHLORAPREP 26ML TINTED ORANGE  260815

## (undated) DEVICE — GLOVE ESTEEM POWDER FREE SMT 6.5  2D72PT65

## (undated) DEVICE — STOCKING SLEEVE COMPRESSION CALF LG

## (undated) DEVICE — PACK C-SECTION LF PL15OTA83B

## (undated) DEVICE — STRAP KNEE/BODY 31143004

## (undated) DEVICE — SU VICRYL 0 CT-1 36" J346H

## (undated) DEVICE — DRSG STERI STRIP 1/4X3" R1541

## (undated) DEVICE — SU PLAIN 3-0 CT 27" 852H

## (undated) RX ORDER — PHENYLEPHRINE HCL IN 0.9% NACL 50MG/250ML
PLASTIC BAG, INJECTION (ML) INTRAVENOUS
Status: DISPENSED
Start: 2022-01-25

## (undated) RX ORDER — FENTANYL CITRATE 50 UG/ML
INJECTION, SOLUTION INTRAMUSCULAR; INTRAVENOUS
Status: DISPENSED
Start: 2022-01-25

## (undated) RX ORDER — LIDOCAINE HYDROCHLORIDE 20 MG/ML
INJECTION, SOLUTION EPIDURAL; INFILTRATION; INTRACAUDAL; PERINEURAL
Status: DISPENSED
Start: 2022-01-25

## (undated) RX ORDER — TRANEXAMIC ACID 10 MG/ML
INJECTION, SOLUTION INTRAVENOUS
Status: DISPENSED
Start: 2022-01-25

## (undated) RX ORDER — OXYTOCIN/0.9 % SODIUM CHLORIDE 30/500 ML
PLASTIC BAG, INJECTION (ML) INTRAVENOUS
Status: DISPENSED
Start: 2022-01-25

## (undated) RX ORDER — MORPHINE SULFATE 1 MG/ML
INJECTION, SOLUTION EPIDURAL; INTRATHECAL; INTRAVENOUS
Status: DISPENSED
Start: 2022-01-25

## (undated) RX ORDER — ONDANSETRON 2 MG/ML
INJECTION INTRAMUSCULAR; INTRAVENOUS
Status: DISPENSED
Start: 2022-01-25

## (undated) RX ORDER — KETOROLAC TROMETHAMINE 30 MG/ML
INJECTION, SOLUTION INTRAMUSCULAR; INTRAVENOUS
Status: DISPENSED
Start: 2022-01-25